# Patient Record
Sex: MALE | Race: WHITE | Employment: OTHER | URBAN - METROPOLITAN AREA
[De-identification: names, ages, dates, MRNs, and addresses within clinical notes are randomized per-mention and may not be internally consistent; named-entity substitution may affect disease eponyms.]

---

## 2018-12-11 ENCOUNTER — HOSPITAL ENCOUNTER (EMERGENCY)
Age: 63
Discharge: HOME | End: 2018-12-11
Attending: EMERGENCY MEDICINE
Payer: COMMERCIAL

## 2018-12-11 VITALS
HEART RATE: 48 BPM | RESPIRATION RATE: 12 BRPM | DIASTOLIC BLOOD PRESSURE: 66 MMHG | TEMPERATURE: 98.3 F | OXYGEN SATURATION: 95 % | SYSTOLIC BLOOD PRESSURE: 112 MMHG

## 2018-12-11 DIAGNOSIS — T40.1X1A ACCIDENTAL OVERDOSE OF HEROIN, INITIAL ENCOUNTER (HCC): Primary | ICD-10-CM

## 2018-12-11 PROCEDURE — 99284 EMERGENCY DEPT VISIT MOD MDM: CPT

## 2018-12-11 RX ORDER — NALOXONE HYDROCHLORIDE 4 MG/.1ML
1 SPRAY NASAL PRN
Qty: 1 EACH | Refills: 0 | Status: SHIPPED | OUTPATIENT
Start: 2018-12-11 | End: 2020-07-09

## 2020-07-09 ENCOUNTER — HOSPITAL ENCOUNTER (INPATIENT)
Age: 65
LOS: 6 days | Discharge: NURSING, REHAB OR LONG-TERM CARE | DRG: 253 | End: 2020-07-16
Attending: PEDIATRICS | Admitting: PEDIATRICS
Payer: MEDICARE

## 2020-07-09 ENCOUNTER — APPOINTMENT (OUTPATIENT)
Dept: GENERAL RADIOLOGY | Age: 65
DRG: 253 | End: 2020-07-09
Payer: MEDICARE

## 2020-07-09 PROBLEM — R22.42 LOCALIZED SWELLING OF LEFT LOWER LEG: Status: ACTIVE | Noted: 2020-07-09

## 2020-07-09 LAB
ALBUMIN SERPL-MCNC: 3.9 G/DL (ref 3.4–5)
ALP BLD-CCNC: 102 U/L (ref 40–129)
ALT SERPL-CCNC: 122 U/L (ref 10–40)
AMPHETAMINE SCREEN, URINE: ABNORMAL
ANION GAP SERPL CALCULATED.3IONS-SCNC: 12 MMOL/L (ref 3–16)
AST SERPL-CCNC: 200 U/L (ref 15–37)
BARBITURATE SCREEN URINE: ABNORMAL
BASOPHILS ABSOLUTE: 0 K/UL (ref 0–0.2)
BASOPHILS RELATIVE PERCENT: 0.6 %
BENZODIAZEPINE SCREEN, URINE: ABNORMAL
BILIRUB SERPL-MCNC: 0.3 MG/DL (ref 0–1)
BILIRUBIN DIRECT: <0.2 MG/DL (ref 0–0.3)
BILIRUBIN URINE: NEGATIVE
BILIRUBIN, INDIRECT: ABNORMAL MG/DL (ref 0–1)
BLOOD, URINE: NEGATIVE
BUN BLDV-MCNC: 7 MG/DL (ref 7–20)
C-REACTIVE PROTEIN: 0.7 MG/L (ref 0–5.1)
CALCIUM SERPL-MCNC: 9.3 MG/DL (ref 8.3–10.6)
CANNABINOID SCREEN URINE: POSITIVE
CHLORIDE BLD-SCNC: 98 MMOL/L (ref 99–110)
CLARITY: ABNORMAL
CO2: 26 MMOL/L (ref 21–32)
COCAINE METABOLITE SCREEN URINE: ABNORMAL
COLOR: ABNORMAL
CREAT SERPL-MCNC: 0.6 MG/DL (ref 0.8–1.3)
EOSINOPHILS ABSOLUTE: 0.2 K/UL (ref 0–0.6)
EOSINOPHILS RELATIVE PERCENT: 3.8 %
EPITHELIAL CELLS, UA: 1 /HPF (ref 0–5)
GFR AFRICAN AMERICAN: >60
GFR NON-AFRICAN AMERICAN: >60
GLUCOSE BLD-MCNC: 111 MG/DL (ref 70–99)
GLUCOSE URINE: NEGATIVE MG/DL
HCT VFR BLD CALC: 42.1 % (ref 40.5–52.5)
HEMOGLOBIN: 13.8 G/DL (ref 13.5–17.5)
HYALINE CASTS: 4 /LPF (ref 0–8)
KETONES, URINE: NEGATIVE MG/DL
LACTIC ACID: 2.2 MMOL/L (ref 0.4–2)
LEUKOCYTE ESTERASE, URINE: NEGATIVE
LYMPHOCYTES ABSOLUTE: 1.8 K/UL (ref 1–5.1)
LYMPHOCYTES RELATIVE PERCENT: 31.6 %
Lab: ABNORMAL
MCH RBC QN AUTO: 31.3 PG (ref 26–34)
MCHC RBC AUTO-ENTMCNC: 32.8 G/DL (ref 31–36)
MCV RBC AUTO: 95.4 FL (ref 80–100)
METHADONE SCREEN, URINE: ABNORMAL
MICROSCOPIC EXAMINATION: YES
MONOCYTES ABSOLUTE: 0.8 K/UL (ref 0–1.3)
MONOCYTES RELATIVE PERCENT: 14.3 %
NEUTROPHILS ABSOLUTE: 2.8 K/UL (ref 1.7–7.7)
NEUTROPHILS RELATIVE PERCENT: 49.7 %
NITRITE, URINE: NEGATIVE
OPIATE SCREEN URINE: ABNORMAL
OXYCODONE URINE: ABNORMAL
PDW BLD-RTO: 14 % (ref 12.4–15.4)
PH UA: 5.5
PH UA: 5.5 (ref 5–8)
PHENCYCLIDINE SCREEN URINE: ABNORMAL
PLATELET # BLD: 147 K/UL (ref 135–450)
PMV BLD AUTO: 7.9 FL (ref 5–10.5)
POTASSIUM REFLEX MAGNESIUM: 4.1 MMOL/L (ref 3.5–5.1)
PROCALCITONIN: 0.06 NG/ML (ref 0–0.15)
PROPOXYPHENE SCREEN: ABNORMAL
PROTEIN UA: NEGATIVE MG/DL
RBC # BLD: 4.41 M/UL (ref 4.2–5.9)
RBC UA: 1 /HPF (ref 0–4)
SEDIMENTATION RATE, ERYTHROCYTE: 11 MM/HR (ref 0–20)
SODIUM BLD-SCNC: 136 MMOL/L (ref 136–145)
SPECIFIC GRAVITY UA: 1.02 (ref 1–1.03)
TOTAL PROTEIN: 7 G/DL (ref 6.4–8.2)
URINE REFLEX TO CULTURE: ABNORMAL
URINE TYPE: ABNORMAL
UROBILINOGEN, URINE: 0.2 E.U./DL
WBC # BLD: 5.6 K/UL (ref 4–11)
WBC UA: 1 /HPF (ref 0–5)

## 2020-07-09 PROCEDURE — G0378 HOSPITAL OBSERVATION PER HR: HCPCS

## 2020-07-09 PROCEDURE — 81001 URINALYSIS AUTO W/SCOPE: CPT

## 2020-07-09 PROCEDURE — 80048 BASIC METABOLIC PNL TOTAL CA: CPT

## 2020-07-09 PROCEDURE — 71046 X-RAY EXAM CHEST 2 VIEWS: CPT

## 2020-07-09 PROCEDURE — 85025 COMPLETE CBC W/AUTO DIFF WBC: CPT

## 2020-07-09 PROCEDURE — 83605 ASSAY OF LACTIC ACID: CPT

## 2020-07-09 PROCEDURE — 84145 PROCALCITONIN (PCT): CPT

## 2020-07-09 PROCEDURE — 36415 COLL VENOUS BLD VENIPUNCTURE: CPT

## 2020-07-09 PROCEDURE — 80076 HEPATIC FUNCTION PANEL: CPT

## 2020-07-09 PROCEDURE — 86140 C-REACTIVE PROTEIN: CPT

## 2020-07-09 PROCEDURE — 85652 RBC SED RATE AUTOMATED: CPT

## 2020-07-09 PROCEDURE — 99284 EMERGENCY DEPT VISIT MOD MDM: CPT

## 2020-07-09 PROCEDURE — 80307 DRUG TEST PRSMV CHEM ANLYZR: CPT

## 2020-07-09 PROCEDURE — 87040 BLOOD CULTURE FOR BACTERIA: CPT

## 2020-07-09 PROCEDURE — 73630 X-RAY EXAM OF FOOT: CPT

## 2020-07-09 ASSESSMENT — ENCOUNTER SYMPTOMS
SHORTNESS OF BREATH: 0
COLOR CHANGE: 1
ABDOMINAL PAIN: 0
NAUSEA: 0
VOMITING: 0
COUGH: 0

## 2020-07-09 ASSESSMENT — PAIN DESCRIPTION - DESCRIPTORS: DESCRIPTORS: ACHING

## 2020-07-09 ASSESSMENT — PAIN DESCRIPTION - PAIN TYPE: TYPE: ACUTE PAIN

## 2020-07-09 ASSESSMENT — PAIN DESCRIPTION - ONSET: ONSET: ON-GOING

## 2020-07-09 ASSESSMENT — PAIN DESCRIPTION - PROGRESSION: CLINICAL_PROGRESSION: NOT CHANGED

## 2020-07-09 ASSESSMENT — PAIN DESCRIPTION - FREQUENCY: FREQUENCY: CONTINUOUS

## 2020-07-09 ASSESSMENT — PAIN DESCRIPTION - ORIENTATION: ORIENTATION: LEFT

## 2020-07-09 ASSESSMENT — PAIN - FUNCTIONAL ASSESSMENT: PAIN_FUNCTIONAL_ASSESSMENT: PREVENTS OR INTERFERES SOME ACTIVE ACTIVITIES AND ADLS

## 2020-07-09 ASSESSMENT — PAIN SCALES - GENERAL: PAINLEVEL_OUTOF10: 6

## 2020-07-09 ASSESSMENT — PAIN DESCRIPTION - LOCATION: LOCATION: ABDOMEN

## 2020-07-10 ENCOUNTER — TELEPHONE (OUTPATIENT)
Dept: ORTHOPEDIC SURGERY | Age: 65
End: 2020-07-10

## 2020-07-10 LAB
A/G RATIO: 1.2 (ref 1.1–2.2)
ALBUMIN SERPL-MCNC: 3.8 G/DL (ref 3.4–5)
ALP BLD-CCNC: 99 U/L (ref 40–129)
ALT SERPL-CCNC: 95 U/L (ref 10–40)
ANION GAP SERPL CALCULATED.3IONS-SCNC: 15 MMOL/L (ref 3–16)
APTT: 67 SEC (ref 24.2–36.2)
AST SERPL-CCNC: 93 U/L (ref 15–37)
BASOPHILS ABSOLUTE: 0.1 K/UL (ref 0–0.2)
BASOPHILS RELATIVE PERCENT: 1.5 %
BILIRUB SERPL-MCNC: 0.6 MG/DL (ref 0–1)
BUN BLDV-MCNC: 6 MG/DL (ref 7–20)
CALCIUM SERPL-MCNC: 9.2 MG/DL (ref 8.3–10.6)
CHLORIDE BLD-SCNC: 93 MMOL/L (ref 99–110)
CO2: 28 MMOL/L (ref 21–32)
CREAT SERPL-MCNC: <0.5 MG/DL (ref 0.8–1.3)
D DIMER: 1645 NG/ML DDU (ref 0–229)
EOSINOPHILS ABSOLUTE: 0.1 K/UL (ref 0–0.6)
EOSINOPHILS RELATIVE PERCENT: 2.4 %
FIBRINOGEN: 213 MG/DL (ref 200–397)
FIBRINOGEN: 229 MG/DL (ref 200–397)
GFR AFRICAN AMERICAN: >60
GFR NON-AFRICAN AMERICAN: >60
GLOBULIN: 3.1 G/DL
GLUCOSE BLD-MCNC: 87 MG/DL (ref 70–99)
HCT VFR BLD CALC: 38 % (ref 40.5–52.5)
HCT VFR BLD CALC: 42.8 % (ref 40.5–52.5)
HEMOGLOBIN: 12.7 G/DL (ref 13.5–17.5)
HEMOGLOBIN: 14.1 G/DL (ref 13.5–17.5)
INR BLD: 0.98 (ref 0.86–1.14)
LYMPHOCYTES ABSOLUTE: 1.4 K/UL (ref 1–5.1)
LYMPHOCYTES RELATIVE PERCENT: 28.1 %
MCH RBC QN AUTO: 31.4 PG (ref 26–34)
MCH RBC QN AUTO: 31.9 PG (ref 26–34)
MCHC RBC AUTO-ENTMCNC: 33 G/DL (ref 31–36)
MCHC RBC AUTO-ENTMCNC: 33.4 G/DL (ref 31–36)
MCV RBC AUTO: 95.2 FL (ref 80–100)
MCV RBC AUTO: 95.7 FL (ref 80–100)
MONOCYTES ABSOLUTE: 0.6 K/UL (ref 0–1.3)
MONOCYTES RELATIVE PERCENT: 12.7 %
NEUTROPHILS ABSOLUTE: 2.8 K/UL (ref 1.7–7.7)
NEUTROPHILS RELATIVE PERCENT: 55.3 %
PDW BLD-RTO: 13.7 % (ref 12.4–15.4)
PDW BLD-RTO: 13.8 % (ref 12.4–15.4)
PLATELET # BLD: 133 K/UL (ref 135–450)
PLATELET # BLD: 139 K/UL (ref 135–450)
PMV BLD AUTO: 8.3 FL (ref 5–10.5)
PMV BLD AUTO: 8.7 FL (ref 5–10.5)
POTASSIUM SERPL-SCNC: 4 MMOL/L (ref 3.5–5.1)
PROTHROMBIN TIME: 11.4 SEC (ref 10–13.2)
RBC # BLD: 3.98 M/UL (ref 4.2–5.9)
RBC # BLD: 4.5 M/UL (ref 4.2–5.9)
SODIUM BLD-SCNC: 136 MMOL/L (ref 136–145)
TOTAL PROTEIN: 6.9 G/DL (ref 6.4–8.2)
URIC ACID, SERUM: 5.4 MG/DL (ref 3.5–7.2)
WBC # BLD: 5 K/UL (ref 4–11)
WBC # BLD: 5.2 K/UL (ref 4–11)

## 2020-07-10 PROCEDURE — 2500000003 HC RX 250 WO HCPCS: Performed by: PEDIATRICS

## 2020-07-10 PROCEDURE — 2709999900 HC NON-CHARGEABLE SUPPLY

## 2020-07-10 PROCEDURE — 6360000002 HC RX W HCPCS

## 2020-07-10 PROCEDURE — 36005 INJECTION EXT VENOGRAPHY: CPT | Performed by: INTERNAL MEDICINE

## 2020-07-10 PROCEDURE — 99153 MOD SED SAME PHYS/QHP EA: CPT

## 2020-07-10 PROCEDURE — 36415 COLL VENOUS BLD VENIPUNCTURE: CPT

## 2020-07-10 PROCEDURE — 85610 PROTHROMBIN TIME: CPT

## 2020-07-10 PROCEDURE — 2100000000 HC CCU R&B

## 2020-07-10 PROCEDURE — C1894 INTRO/SHEATH, NON-LASER: HCPCS

## 2020-07-10 PROCEDURE — 36005 INJECTION EXT VENOGRAPHY: CPT

## 2020-07-10 PROCEDURE — 6370000000 HC RX 637 (ALT 250 FOR IP): Performed by: PEDIATRICS

## 2020-07-10 PROCEDURE — 2580000003 HC RX 258: Performed by: PEDIATRICS

## 2020-07-10 PROCEDURE — B51C1ZZ FLUOROSCOPY OF LEFT LOWER EXTREMITY VEINS USING LOW OSMOLAR CONTRAST: ICD-10-PCS | Performed by: INTERNAL MEDICINE

## 2020-07-10 PROCEDURE — 6360000002 HC RX W HCPCS: Performed by: PEDIATRICS

## 2020-07-10 PROCEDURE — 37249 TRLUML BALO ANGIOP ADDL VEIN: CPT

## 2020-07-10 PROCEDURE — 80053 COMPREHEN METABOLIC PANEL: CPT

## 2020-07-10 PROCEDURE — 37212 THROMBOLYTIC VENOUS THERAPY: CPT | Performed by: INTERNAL MEDICINE

## 2020-07-10 PROCEDURE — 84550 ASSAY OF BLOOD/URIC ACID: CPT

## 2020-07-10 PROCEDURE — 76499 UNLISTED DX RADIOGRAPHIC PX: CPT

## 2020-07-10 PROCEDURE — C1751 CATH, INF, PER/CENT/MIDLINE: HCPCS

## 2020-07-10 PROCEDURE — 75820 VEIN X-RAY ARM/LEG: CPT

## 2020-07-10 PROCEDURE — 93971 EXTREMITY STUDY: CPT

## 2020-07-10 PROCEDURE — 85730 THROMBOPLASTIN TIME PARTIAL: CPT

## 2020-07-10 PROCEDURE — 2500000003 HC RX 250 WO HCPCS

## 2020-07-10 PROCEDURE — 6360000004 HC RX CONTRAST MEDICATION: Performed by: HOSPITALIST

## 2020-07-10 PROCEDURE — 6360000002 HC RX W HCPCS: Performed by: INTERNAL MEDICINE

## 2020-07-10 PROCEDURE — 99215 OFFICE O/P EST HI 40 MIN: CPT | Performed by: INTERNAL MEDICINE

## 2020-07-10 PROCEDURE — 85027 COMPLETE CBC AUTOMATED: CPT

## 2020-07-10 PROCEDURE — C1769 GUIDE WIRE: HCPCS

## 2020-07-10 PROCEDURE — 85384 FIBRINOGEN ACTIVITY: CPT

## 2020-07-10 PROCEDURE — 2580000003 HC RX 258: Performed by: INTERNAL MEDICINE

## 2020-07-10 PROCEDURE — 3E03317 INTRODUCTION OF OTHER THROMBOLYTIC INTO PERIPHERAL VEIN, PERCUTANEOUS APPROACH: ICD-10-PCS | Performed by: INTERNAL MEDICINE

## 2020-07-10 PROCEDURE — 067N3ZZ DILATION OF LEFT FEMORAL VEIN, PERCUTANEOUS APPROACH: ICD-10-PCS | Performed by: INTERNAL MEDICINE

## 2020-07-10 PROCEDURE — 37248 TRLUML BALO ANGIOP 1ST VEIN: CPT

## 2020-07-10 PROCEDURE — C1887 CATHETER, GUIDING: HCPCS

## 2020-07-10 PROCEDURE — 2580000003 HC RX 258

## 2020-07-10 PROCEDURE — 75820 VEIN X-RAY ARM/LEG: CPT | Performed by: INTERNAL MEDICINE

## 2020-07-10 PROCEDURE — 6370000000 HC RX 637 (ALT 250 FOR IP): Performed by: HOSPITALIST

## 2020-07-10 PROCEDURE — C1725 CATH, TRANSLUMIN NON-LASER: HCPCS

## 2020-07-10 PROCEDURE — 85025 COMPLETE CBC W/AUTO DIFF WBC: CPT

## 2020-07-10 PROCEDURE — 85379 FIBRIN DEGRADATION QUANT: CPT

## 2020-07-10 PROCEDURE — 99152 MOD SED SAME PHYS/QHP 5/>YRS: CPT

## 2020-07-10 PROCEDURE — 37212 THROMBOLYTIC VENOUS THERAPY: CPT

## 2020-07-10 RX ORDER — SODIUM CHLORIDE 0.9 % (FLUSH) 0.9 %
10 SYRINGE (ML) INJECTION EVERY 12 HOURS SCHEDULED
Status: DISCONTINUED | OUTPATIENT
Start: 2020-07-10 | End: 2020-07-10 | Stop reason: SDUPTHER

## 2020-07-10 RX ORDER — LORAZEPAM 1 MG/1
2 TABLET ORAL
Status: DISCONTINUED | OUTPATIENT
Start: 2020-07-10 | End: 2020-07-11

## 2020-07-10 RX ORDER — HEPARIN SODIUM AND DEXTROSE 10000; 5 [USP'U]/100ML; G/100ML
250 INJECTION INTRAVENOUS CONTINUOUS
Status: DISCONTINUED | OUTPATIENT
Start: 2020-07-10 | End: 2020-07-13 | Stop reason: ALTCHOICE

## 2020-07-10 RX ORDER — LORAZEPAM 1 MG/1
3 TABLET ORAL
Status: DISCONTINUED | OUTPATIENT
Start: 2020-07-10 | End: 2020-07-11

## 2020-07-10 RX ORDER — SODIUM CHLORIDE 0.9 % (FLUSH) 0.9 %
10 SYRINGE (ML) INJECTION PRN
Status: DISCONTINUED | OUTPATIENT
Start: 2020-07-10 | End: 2020-07-10 | Stop reason: SDUPTHER

## 2020-07-10 RX ORDER — LORAZEPAM 2 MG/ML
1 INJECTION INTRAMUSCULAR
Status: DISCONTINUED | OUTPATIENT
Start: 2020-07-10 | End: 2020-07-11

## 2020-07-10 RX ORDER — LORAZEPAM 1 MG/1
1 TABLET ORAL
Status: DISCONTINUED | OUTPATIENT
Start: 2020-07-10 | End: 2020-07-11

## 2020-07-10 RX ORDER — DIPHENHYDRAMINE HCL 25 MG
25 TABLET ORAL NIGHTLY PRN
Status: DISCONTINUED | OUTPATIENT
Start: 2020-07-10 | End: 2020-07-16 | Stop reason: HOSPADM

## 2020-07-10 RX ORDER — SODIUM CHLORIDE 0.9 % (FLUSH) 0.9 %
10 SYRINGE (ML) INJECTION PRN
Status: DISCONTINUED | OUTPATIENT
Start: 2020-07-10 | End: 2020-07-13 | Stop reason: SDUPTHER

## 2020-07-10 RX ORDER — SODIUM CHLORIDE 9 MG/ML
INJECTION, SOLUTION INTRAVENOUS CONTINUOUS
Status: DISCONTINUED | OUTPATIENT
Start: 2020-07-10 | End: 2020-07-13 | Stop reason: ALTCHOICE

## 2020-07-10 RX ORDER — LORAZEPAM 2 MG/ML
3 INJECTION INTRAMUSCULAR
Status: DISCONTINUED | OUTPATIENT
Start: 2020-07-10 | End: 2020-07-11

## 2020-07-10 RX ORDER — ACETAMINOPHEN 325 MG/1
650 TABLET ORAL EVERY 6 HOURS PRN
Status: DISCONTINUED | OUTPATIENT
Start: 2020-07-10 | End: 2020-07-13 | Stop reason: SDUPTHER

## 2020-07-10 RX ORDER — LORAZEPAM 1 MG/1
4 TABLET ORAL
Status: DISCONTINUED | OUTPATIENT
Start: 2020-07-10 | End: 2020-07-11

## 2020-07-10 RX ORDER — FOLIC ACID 1 MG/1
1 TABLET ORAL DAILY
Status: DISCONTINUED | OUTPATIENT
Start: 2020-07-10 | End: 2020-07-12

## 2020-07-10 RX ORDER — LORAZEPAM 2 MG/ML
4 INJECTION INTRAMUSCULAR
Status: DISCONTINUED | OUTPATIENT
Start: 2020-07-10 | End: 2020-07-11

## 2020-07-10 RX ORDER — SODIUM CHLORIDE 0.9 % (FLUSH) 0.9 %
10 SYRINGE (ML) INJECTION EVERY 12 HOURS SCHEDULED
Status: DISCONTINUED | OUTPATIENT
Start: 2020-07-10 | End: 2020-07-13 | Stop reason: SDUPTHER

## 2020-07-10 RX ORDER — ONDANSETRON 4 MG/1
4 TABLET, ORALLY DISINTEGRATING ORAL EVERY 8 HOURS PRN
Status: DISCONTINUED | OUTPATIENT
Start: 2020-07-10 | End: 2020-07-16 | Stop reason: HOSPADM

## 2020-07-10 RX ORDER — LORAZEPAM 2 MG/ML
2 INJECTION INTRAMUSCULAR
Status: DISCONTINUED | OUTPATIENT
Start: 2020-07-10 | End: 2020-07-11

## 2020-07-10 RX ORDER — ACETAMINOPHEN 650 MG/1
650 SUPPOSITORY RECTAL EVERY 6 HOURS PRN
Status: DISCONTINUED | OUTPATIENT
Start: 2020-07-10 | End: 2020-07-13 | Stop reason: SDUPTHER

## 2020-07-10 RX ORDER — THIAMINE MONONITRATE (VIT B1) 100 MG
100 TABLET ORAL DAILY
Status: DISCONTINUED | OUTPATIENT
Start: 2020-07-10 | End: 2020-07-12

## 2020-07-10 RX ORDER — ONDANSETRON 2 MG/ML
4 INJECTION INTRAMUSCULAR; INTRAVENOUS EVERY 6 HOURS PRN
Status: DISCONTINUED | OUTPATIENT
Start: 2020-07-10 | End: 2020-07-16 | Stop reason: HOSPADM

## 2020-07-10 RX ORDER — DIPHENHYDRAMINE HCL 25 MG
25 TABLET ORAL NIGHTLY PRN
Status: DISCONTINUED | OUTPATIENT
Start: 2020-07-11 | End: 2020-07-10

## 2020-07-10 RX ORDER — DIAZEPAM 5 MG/1
5 TABLET ORAL ONCE
Status: COMPLETED | OUTPATIENT
Start: 2020-07-10 | End: 2020-07-10

## 2020-07-10 RX ORDER — MULTIVITAMIN WITH IRON
1 TABLET ORAL DAILY
Status: DISCONTINUED | OUTPATIENT
Start: 2020-07-10 | End: 2020-07-12

## 2020-07-10 RX ORDER — POLYETHYLENE GLYCOL 3350 17 G/17G
17 POWDER, FOR SOLUTION ORAL DAILY PRN
Status: DISCONTINUED | OUTPATIENT
Start: 2020-07-10 | End: 2020-07-12

## 2020-07-10 RX ADMIN — THERA TABS 1 TABLET: TAB at 11:07

## 2020-07-10 RX ADMIN — IOPAMIDOL 60 ML: 755 INJECTION, SOLUTION INTRAVENOUS at 18:15

## 2020-07-10 RX ADMIN — HEPARIN SODIUM 250 UNITS/HR: 10000 INJECTION, SOLUTION INTRAVENOUS at 18:34

## 2020-07-10 RX ADMIN — ENOXAPARIN SODIUM 90 MG: 100 INJECTION SUBCUTANEOUS at 11:07

## 2020-07-10 RX ADMIN — Medication 100 MG: at 11:07

## 2020-07-10 RX ADMIN — FOLIC ACID 1 MG: 1 TABLET ORAL at 11:10

## 2020-07-10 RX ADMIN — LORAZEPAM 1 MG: 2 INJECTION INTRAMUSCULAR; INTRAVENOUS at 15:59

## 2020-07-10 RX ADMIN — SODIUM CHLORIDE, PRESERVATIVE FREE 10 ML: 5 INJECTION INTRAVENOUS at 11:08

## 2020-07-10 RX ADMIN — SODIUM CHLORIDE, PRESERVATIVE FREE 10 ML: 5 INJECTION INTRAVENOUS at 22:14

## 2020-07-10 RX ADMIN — SODIUM CHLORIDE: 9 INJECTION, SOLUTION INTRAVENOUS at 18:33

## 2020-07-10 RX ADMIN — FOLIC ACID: 5 INJECTION, SOLUTION INTRAMUSCULAR; INTRAVENOUS; SUBCUTANEOUS at 02:49

## 2020-07-10 RX ADMIN — DIAZEPAM 5 MG: 5 TABLET ORAL at 00:54

## 2020-07-10 RX ADMIN — LORAZEPAM 2 MG: 2 INJECTION INTRAMUSCULAR; INTRAVENOUS at 12:47

## 2020-07-10 RX ADMIN — ALTEPLASE 1 MG/HR: 2.2 INJECTION, POWDER, LYOPHILIZED, FOR SOLUTION INTRAVENOUS at 18:34

## 2020-07-10 RX ADMIN — ENOXAPARIN SODIUM 90 MG: 100 INJECTION SUBCUTANEOUS at 02:54

## 2020-07-10 ASSESSMENT — PAIN DESCRIPTION - LOCATION
LOCATION: LEG

## 2020-07-10 ASSESSMENT — PAIN DESCRIPTION - DESCRIPTORS
DESCRIPTORS: SORE
DESCRIPTORS: ACHING;SORE
DESCRIPTORS: ACHING;SORE

## 2020-07-10 ASSESSMENT — PAIN DESCRIPTION - PROGRESSION
CLINICAL_PROGRESSION: NOT CHANGED

## 2020-07-10 ASSESSMENT — PAIN DESCRIPTION - ORIENTATION
ORIENTATION: LEFT

## 2020-07-10 ASSESSMENT — PAIN DESCRIPTION - ONSET
ONSET: ON-GOING

## 2020-07-10 ASSESSMENT — PAIN DESCRIPTION - PAIN TYPE
TYPE: ACUTE PAIN
TYPE: ACUTE PAIN;CHRONIC PAIN
TYPE: ACUTE PAIN

## 2020-07-10 ASSESSMENT — PAIN - FUNCTIONAL ASSESSMENT
PAIN_FUNCTIONAL_ASSESSMENT: PREVENTS OR INTERFERES SOME ACTIVE ACTIVITIES AND ADLS

## 2020-07-10 ASSESSMENT — PAIN SCALES - GENERAL
PAINLEVEL_OUTOF10: 5
PAINLEVEL_OUTOF10: 0
PAINLEVEL_OUTOF10: 5
PAINLEVEL_OUTOF10: 4
PAINLEVEL_OUTOF10: 0

## 2020-07-10 ASSESSMENT — PAIN DESCRIPTION - FREQUENCY
FREQUENCY: CONTINUOUS

## 2020-07-10 ASSESSMENT — PAIN DESCRIPTION - DIRECTION: RADIATING_TOWARDS: LEFT FOOT

## 2020-07-10 NOTE — PLAN OF CARE
Problem: Pain:  Description: Pain management should include both nonpharmacologic and pharmacologic interventions. Goal: Pain level will decrease  Description: Pain level will decrease  7/10/2020 1457 by Marily Hubbard RN  Outcome: Ongoing     Problem: Pain:  Description: Pain management should include both nonpharmacologic and pharmacologic interventions. Goal: Control of acute pain  Description: Control of acute pain  7/10/2020 1457 by Marily Hubbard RN  Outcome: Ongoing     Problem: Pain:  Description: Pain management should include both nonpharmacologic and pharmacologic interventions.   Goal: Control of chronic pain  Description: Control of chronic pain  7/10/2020 1457 by Marily Hubbard RN  Outcome: Ongoing     Problem: Falls - Risk of:  Goal: Will remain free from falls  Description: Will remain free from falls  7/10/2020 1457 by Marily Hubbard RN  Outcome: Ongoing     Problem: Falls - Risk of:  Goal: Absence of physical injury  Description: Absence of physical injury  7/10/2020 1457 by Marily Hubbard RN  Outcome: Ongoing

## 2020-07-10 NOTE — PROGRESS NOTES
0755 Pt rated his pain 5 out of 10 in left leg but refused Tylenol. No other pain medication ordered. Pt off unit for procedure at 0845 , transported by stretcher. Pt return to unit at 0958 . Patient is A&O awake,calm, quiet in bed. Patient bed in lowest position, call light in reach, and bed alarm engaged. This RN will continue to monitor. Jose Quigley

## 2020-07-10 NOTE — PROGRESS NOTES
Hospitalist Progress Note    CC:     Leg Pain (leg pain and swelling. onset 6 weeks ago. leg is swollen and red. denies fever)      Admit date: 7/9/2020  Days in hospital:  0    Subjective:       Significant pain in his leg, not able to walk. Worried about alcohol withdrawal and asking for beers     ROS:   Review of Systems   Constitutional: Negative for chills and fever. Respiratory: Negative for cough and shortness of breath. Cardiovascular: Negative for chest pain and palpitations. Gastrointestinal: Negative for abdominal pain, constipation and diarrhea. Genitourinary: Negative for dysuria and urgency. Neurological: Negative for headaches. Objective:    /63   Pulse 62   Temp 97.8 °F (36.6 °C) (Oral)   Resp 14   Ht 6' 3\" (1.905 m)   Wt 206 lb 12.7 oz (93.8 kg)   SpO2 94%   BMI 25.85 kg/m²     Gen: alert, NAD  HEENT: NC/AT, moist mucous membranes, no oropharyngeal erythema or exudate  Neck: supple, trachea midline, no anterior cervical or SC LAD  Heart: Normal s1/s2, RRR, no murmurs, gallops, or rubs. Lungs: CTA bilaterally, no wheezing, no rales, no rhonchi, no use of accessory muscles  Abd: bowel sounds present, soft, nondistended, none tender  Extrem: Left lower Ext swelling and tenderness all the way to his thigh. Good peripheral pulses. Skin: Poor hygiene. Psych:A & O x3  , affect appropriate  Neuro: grossly intact, moves all four extremities spontaneously.       Medications:  Scheduled Meds:   sodium chloride flush  10 mL Intravenous 2 times per day    thiamine  100 mg Oral Daily    multivitamin  1 tablet Oral Daily    enoxaparin  1 mg/kg Subcutaneous BID       PRN Meds:  sodium chloride flush, acetaminophen **OR** acetaminophen, polyethylene glycol, ondansetron **OR** ondansetron, LORazepam **OR** LORazepam **OR** LORazepam **OR** LORazepam **OR** LORazepam **OR** LORazepam **OR** LORazepam **OR** LORazepam    IV:      No intake or output data in the 24

## 2020-07-10 NOTE — ED NOTES
Report given to Ezra Garcia RN to assume care. No further questions at this time.      Brooke Sagastume RN  07/09/20 2691

## 2020-07-10 NOTE — H&P
rate and rhythm with normal S1/S2 without murmurs, rubs or gallops. Abdomen: Soft, non-tender, non-distended with normal bowel sounds. Musculoskeletal:  No clubbing, cyanosis or edema bilaterally. Full range of motion without deformity. Skin: Left leg erythematous and diffusely swollen. Pitting edema to knee. Very tender to palpation in calf. Neurologic:  Neurovascularly intact without any focal sensory/motor deficits. Cranial nerves: II-XII intact, grossly non-focal.  Psychiatric:  Alert and oriented, thought content appropriate, normal insight  Capillary Refill: Brisk,< 3 seconds   Peripheral Pulses: +2 palpable, equal bilaterally       Labs:     Recent Labs     07/09/20  1750   WBC 5.6   HGB 13.8   HCT 42.1        Recent Labs     07/09/20  1750      K 4.1   CL 98*   CO2 26   BUN 7   CREATININE 0.6*   CALCIUM 9.3     Recent Labs     07/09/20  1750   *   *   BILIDIR <0.2   BILITOT 0.3   ALKPHOS 102     No results for input(s): INR in the last 72 hours. No results for input(s): Jade Formosa in the last 72 hours. Urinalysis:      Lab Results   Component Value Date    NITRU Negative 07/09/2020    WBCUA 1 07/09/2020    RBCUA 1 07/09/2020    BLOODU Negative 07/09/2020    SPECGRAV 1.019 07/09/2020    GLUCOSEU Negative 07/09/2020      Radiology:     XR CHEST STANDARD (2 VW)   Final Result   No evidence of acute cardiopulmonary disease. XR FOOT LEFT (MIN 3 VIEWS)    (Results Pending)       ASSESSMENT/PLAN:  L leg swelling  Think most likely this is DVT. No leukocytosis and minimally elevated inflammatory markers. Well score for DVT of 4.   D-dimer result from lab reported as high with quantification still pending as a result.  -Starting Lovenox 1 mg/kg twice daily subcutaneous  -Left lower extremity ultrasound in morning    Alcohol abuse  -[facility] protocol    DVT Prophylaxis:Lovenox   Diet:Regular    Code Status:Full   Surrogate:Brother in law, [facility] [employer] [employer] -admitting to [facility] bed anticipate greater than 2 midnight stay       Sanda Schirmer, MD    Thank you No primary care provider on file. for the opportunity to be involved in this patient's care. If you have any questions or concerns please feel free to contact me at [phone].

## 2020-07-10 NOTE — CARE COORDINATION
[facility] Nurse  Consult Note       NAME:  Annabel Menendez  MEDICAL RECORD NUMBER:  [id number]  AGE: 72 y.o. GENDER: male  : 1955  TODAY'S DATE:  7/10/2020    Subjective   Reason for WOCN Evaluation and Assessment: unstageable to left lateral great toe-> no wound noted, area is red, closed. Per pt had been scraped at one time. Please re-consult if needed. Annabel Menendez is a 72 y.o. male referred by:   [] Physician  [x] Nursing  [] Other:   Patient Goal of Care:  [] Wound Healing  [] Odor Control  [] Palliative Care  [] Pain Control   [] Other:         PAST MEDICAL HISTORY        Diagnosis Date    Hyperlipidemia     Seizures ([facility] [address]        PAST SURGICAL HISTORY    History reviewed. No pertinent surgical history. FAMILY HISTORY    History reviewed. No pertinent family history. SOCIAL HISTORY    Social History     Tobacco Use    Smoking status: Current Every Day Smoker     Packs/day: 1.50     Types: Cigarettes    Smokeless tobacco: Never Used   Substance Use Topics    Alcohol use: Yes     Alcohol/week: 56.0 standard drinks     Types: 56 Cans of beer per week    Drug use: Yes     Types: Marijuana, IV, Opiates      Comment: Heroin - snorts       ALLERGIES    No Known Allergies    MEDICATIONS    No current facility-administered medications on file prior to encounter. No current outpatient medications on file prior to encounter.        Objective    /63   Pulse 72   Temp 97.8 °F (36.6 °C) (Oral)   Resp 14   Ht 6' 3\" (1.905 m)   Wt 206 lb 12.7 oz (93.8 kg)   SpO2 94%   BMI 25.85 kg/m²     LABS:  WBC:    Lab Results   Component Value Date    WBC 5.6 2020     H/H:    Lab Results   Component Value Date    HGB 13.8 2020    HCT 42.1 2020     PTT:  No results found for: APTT, PTT[APTT}  PT/INR:  No results found for: PROTIME, INR  HgBA1c:  No results found for: LABA1C    Assessment   Alen Risk Score: Alen Scale Score: 19    Patient Active Problem List   Diagnosis Code    Localized swelling of left lower leg R22.42       Measurements:      Pt seen. LLE edmeatous, red and warm from toes to groin. Vascualr studies done -       Left   - Acute versus subacute totally occluding deep vein thrombosis involving the   left external iliac vein, common femoral, profunda, femoral, posterior   tibials, perennials and anterior tibial veins.   - There is acute versus subacute partially occluding deep vein thrombosis   involving the left popliteal vein. - Acute totally occluding superficial venous thrombosis involving the left   greater saphenous vein from zone 1 to zone 8 (the entire length of the leg). - Calf veins were not well visualized on the left due to edema.   - There is no other evidence of deep vein or superficial vein thrombosis   involving the left lower extremity and the right common femoral vein.         Has 3+ edema to LLE. No open wound noted to left foot now that feet have been cleaned. Evidence of healed area, but no necrosis or slough noted. Foot XR shows possible foreign bodies in foot, ortho has been consulted. Response to treatment:  Well tolerated by patient. Plan   Plan of Care:    Cont to cleanse bilat feet, apply moisturizer.     Specialty Bed Required : No   [] Low Air Loss   [] Pressure Redistribution  [] Fluid Immersion  [] Bariatric  [] Total Pressure Relief  [] Other:     Current Diet: Diet NPO Effective Now  Dietician consult:  No    Discharge Plan:  Placement for patient upon discharge: home with support    Patient appropriate for Outpatient [address]: No    Ref[facility]:  []   [facility]  [] Supplies  [] Other    Patient/Caregiver Teaching:  Level of patient/caregiver understanding able to:   [] Indicates understanding       [x] Needs reinforcement  [] Unsuccessful      [] Verbal Understanding  [] Demonstrated understanding       [] No evidence of learning  [] Refused teaching         [] N/A       Electronically signed by Danya Mckeon on 7/10/2020 at 11:14 AM

## 2020-07-10 NOTE — PLAN OF CARE
Problem: Pain:  Goal: Pain level will decrease  Description: Pain level will decrease  7/10/2020 0516 by Gómez Jorgensen RN  Outcome: Ongoing  C/o pain to the LLE with activity. Denies pain at rest. No pain medication needed during the night.      Problem: Pain:  Goal: Control of acute pain  Description: Control of acute pain  7/10/2020 0515 by Gómez Jorgensen RN  Outcome: Ongoing     Problem: Pain:  Goal: Control of chronic pain  Description: Control of chronic pain  7/10/2020 0516 by Gómez Jorgensen RN  Outcome: Ongoing

## 2020-07-10 NOTE — TELEPHONE ENCOUNTER
Asked to consult this pt who was admitted for left leg DVT, swollen for 6 weeks. He is known ETOH abuse, unkept. Xray left foot noted tiny foreign bodies. Exam of foot nontender at these areas. Check uric acid otherwise no treatment. Please review foot xrays.

## 2020-07-10 NOTE — PROGRESS NOTES
Pharmacy Medication Reconciliation Note     List of medications patient is currently taking is complete. Source of information:   1. Conversation with patient at bedside. 2. Epic records. ALLERGY  Patient has no known allergies. Notes regarding home medications:   1. Patient denies any prescription, herbal or OTC medications. 2. Patient states he is supposed to be on a cholesterol medication (dispense reports indicates maybe simvastatin), and an aspirin 81 mg, but he reports he does not taken them and hasn't in over a year.      Stephania Starkey, Pharmacy Intern  7/9/2020  10:46 PM

## 2020-07-10 NOTE — PROGRESS NOTES
1239 Patient is persistently asking about order for beers expresses that he does not want to withdraw. Patient is really anxious and agitation increasing CIWA score 14,  Expresses fear of operation. This RN  3 mg of PO Ativan and  notified MD.     This RN prepared patient for procedure. Consent obtained patient showered at 1440, This RN stayed with patient for safety and assistance and PIV dressing changed, flushed and saline locked. 1512 Patient anxious, and quiet in bed. Safety measures in place, nonskid socks on. Call light within reach bed in lowest position.

## 2020-07-10 NOTE — PROGRESS NOTES
4 Eyes Skin Assessment     The patient is being assess for  Admission    I agree that 2 RN's have performed a thorough Head to Toe Skin Assessment on the patient. ALL assessment sites listed below have been assessed. Areas assessed by both nurses:   [x]   Head, Face, and Ears   [x]   Shoulders, Back, and Chest  [x]   Arms, Elbows, and Hands   [x]   Coccyx, Sacrum, and IschIum  [x]   Legs, Feet, and Heels        Does the Patient have Skin Breakdown?   Yes LDA WOUND CARE was Initiated documentation include the Barbara-wound, Wound Assessment, Measurements, Dressing Treatment, Drainage, and Color\",        Alen Prevention initiated:  Yes   Wound Care Orders initiated:  No      WOC nurse consulted for Pressure Injury (Stage 3,4, Unstageable, DTI, NWPT, and Complex wounds), New and Established Ostomies:  Yes      Nurse 1 eSignature: Electronically signed by Ascencion Burr RN on 7/10/20 at 7:52 AM EDT    **SHARE this note so that the co-signing nurse is able to place an eSignature**    Nurse 2 eSignature: {Esignature:[id number]}

## 2020-07-10 NOTE — ED PROVIDER NOTES
**EVALUATED BY ADVANCED PRACTICE PROVIDER**        [address]      Pt Name: Jose Alejandro Dunn  TON:[id number]  Hanygfscooby 1955  Date of evaluation: 7/9/2020  Provider: FUNMI Morales CNP      Chief Complaint:    Chief Complaint   Patient presents with    Leg Pain     leg pain and swelling. onset 6 weeks ago. leg is swollen and red. denies fever       Nursing Notes, Past Medical Hx, Past Surgical Hx, Social Hx, Allergies, and Family Hx were all reviewed and agreed with or any disagreements were addressed in the HPI.    HPI:  (Location, Duration, Timing, Severity, Quality, Assoc Sx, Context, Modifying factors)  This is a  72 y.o. male who presents to the emergency department today complaining of left leg swelling, redness, warmth, and pain. Symptoms have been getting worse for the last 6 weeks. No measured fevers at home. He denies any injury. No numbness or weakness. PastMedical/Surgical History:      Diagnosis Date    Hyperlipidemia     Seizures ([facility] [address]      History reviewed. No pertinent surgical history. Medications:  Previous Medications    No medications on file         Review of Systems:  Review of Systems   Constitutional: Negative for chills, diaphoresis and fever. Respiratory: Negative for cough and shortness of breath. Cardiovascular: Negative for chest pain. Gastrointestinal: Negative for abdominal pain, nausea and vomiting. Musculoskeletal: Positive for joint swelling and myalgias (LLE). Skin: Positive for color change and wound. Allergic/Immunologic: Negative for immunocompromised state. Neurological: Negative for dizziness, weakness, numbness and headaches. Hematological: Negative for adenopathy. Psychiatric/Behavioral: Negative for confusion. All other systems reviewed and are negative. Positives and Pertinent negatives as per HPI.   Except as noted above in the ROS, problem specific ROS was completed and is negative. Physical Exam:  Physical Exam  Vitals signs and nursing note reviewed. Constitutional:       General: He is not in acute distress. Appearance: Normal appearance. He is well-developed. He is not toxic-appearing. Comments: Unkempt 70-year-old male with poor body hygiene lying in bed in no acute distress. HENT:      Head: Normocephalic and atraumatic. Eyes:      General: No scleral icterus. Conjunctiva/sclera: Conjunctivae normal.   Neck:      Musculoskeletal: Normal range of motion. Vascular: No JVD. Cardiovascular:      Rate and Rhythm: Normal rate and regular rhythm. Heart sounds: No murmur. No friction rub. No gallop. Pulmonary:      Effort: Pulmonary effort is normal. No respiratory distress. Breath sounds: Normal breath sounds. Abdominal:      General: There is no distension. Palpations: Abdomen is soft. Abdomen is not rigid. Tenderness: There is no abdominal tenderness. Musculoskeletal: Normal range of motion. Skin:     General: Skin is warm and dry. Capillary Refill: Capillary refill takes less than 2 seconds. Findings: Erythema and lesion present. Comments: Redness, warmth, and swelling to the entire LLE. DP pulse is dopplerable. Muscles are soft without signs of compartment syndrome. Wounds on left foot that appear chronic. Neurological:      General: No focal deficit present. Mental Status: He is alert and oriented to person, place, and time.    Psychiatric:         Mood and Affect: Mood normal.                     MEDICAL DECISION MAKING    Vitals:    Vitals:    07/09/20 1736 07/09/20 1946 07/09/20 2002 07/09/20 2017   BP: (!) 143/80 (!) 169/85 (!) 135/97 136/84   Pulse: 64   65   Resp: 17   18   Temp: 97.8 °F (36.6 °C)      TempSrc: Oral      SpO2: 96% 100%  100%   Weight: 206 lb 2.1 oz (93.5 kg)      Height: 6' 3\" (1.905 m)          LABS:  Labs Reviewed   BASIC METABOLIC PANEL W/ REFLEX TO MG FOR LOW K - Abnormal; Notable for the following components:       Result Value    Chloride 98 (*)     Glucose 111 (*)     CREATININE 0.6 (*)     All other components within normal limits    Narrative:     Performed at:  [facility]  [address]   Phone [phone]   LACTIC ACID, PLASMA - Abnormal; Notable for the following components:    Lactic Acid 2.2 (*)     All other components within normal limits    Narrative:     Performed at:  [facility]  [address]   Phone [phone]   URINE RT REFLEX TO CULTURE - Abnormal; Notable for the following components:    Clarity, UA CLOUDY (*)     All other components within normal limits    Narrative:     Performed at:  [facility]  [address]   Phone [phone]   HEPATIC FUNCTION PANEL - Abnormal; Notable for the following components:     (*)      (*)     All other components within normal limits    Narrative:     Performed at:  [facility]  [address]   Phone [phone]   URINE DRUG SCREEN - Abnormal; Notable for the following components:    Cannabinoid Scrn, Ur POSITIVE (*)     All other components within normal limits    Narrative:     Performed at:  [facility]  [address]   Phone [phone]   CULTURE, BLOOD 2   CULTURE, BLOOD 1   CBC WITH AUTO DIFFERENTIAL    Narrative:     Performed at:  [facility]  [address]   Phone [phone]   SEDIMENTATION RATE    Narrative:     Performed at:  [facility]  [address]   Phone [phone]   C-REACTIVE PROTEIN    Narrative:     Performed at:  [facility]) - Reversal Use as Directed when concerned about life threatening overdose. NONFORMULARY        NONFORMULARY        SULFACETAMIDE (BLEPH-10) 10 % OPHTHALMIC SOLUTION    Place 1 drop into the right eye 4 times daily Every 6 hours.               (Please note the MDM and HPI sections of this note were completed with a voice recognition program.  Efforts were made to edit the dictations but occasionally words are mis-transcribed.)    Electronically signed, FUNMI Love CNP,           FUNMI Love CNP  07/09/20 9104

## 2020-07-10 NOTE — CONSULTS
[facility] Orthopedic Surgery  Consult Note    This patient is seen in consultation at the request of Dr Corin Cerrato    Reason for Consult: left foot foreign body      CHIEF COMPLAINT:  Left leg pain and swelling    History Obtained From:  patient, electronic medical record    HISTORY OF PRESENT ILLNESS:    The patient is a 72 y.o. male who presents with left leg pain and swelling for about 6 weeks. He denies injury. He admits to ETOH abuse. Pain is described in left leg swelling and with the intensity of moderate. Pain is described as aching, pressure. Discomfort is persistent. He is  Noted with DVT on admission and currently on anticoagulant. Xrays of left foot noted incidental foreign body and ortho was consulted for evaluation. Pt says most pain in calf, no foot    Past Medical History:        Diagnosis Date    Hyperlipidemia     Seizures ([address]        Past Surgical History:    History reviewed. No pertinent surgical history. Social History     Tobacco Use    Smoking status: Current Every Day Smoker     Packs/day: 1.50     Types: Cigarettes    Smokeless tobacco: Never Used   Substance Use Topics    Alcohol use: Yes     Alcohol/week: 56.0 standard drinks     Types: 56 Cans of beer per week       History reviewed. No pertinent family history.         Current Medications:   Current Facility-Administered Medications: sodium chloride flush 0.9 % injection 10 mL, 10 mL, Intravenous, 2 times per day  sodium chloride flush 0.9 % injection 10 mL, 10 mL, Intravenous, PRN  acetaminophen (TYLENOL) tablet 650 mg, 650 mg, Oral, Q6H PRN **OR** acetaminophen (TYLENOL) suppository 650 mg, 650 mg, Rectal, Q6H PRN  polyethylene glycol (GLYCOLAX) packet 17 g, 17 g, Oral, Daily PRN  vitamin B-1 (THIAMINE) tablet 100 mg, 100 mg, Oral, Daily  multivitamin 1 tablet, 1 tablet, Oral, Daily  ondansetron (ZOFRAN-ODT) disintegrating tablet 4 mg, 4 mg, Oral, Q8H PRN **OR** ondansetron (ZOFRAN) injection 4 mg, 4 mg, Intravenous, Q6H PRN  LORazepam (ATIVAN) tablet 1 mg, 1 mg, Oral, Q1H PRN **OR** LORazepam (ATIVAN) injection 1 mg, 1 mg, Intravenous, Q1H PRN **OR** LORazepam (ATIVAN) tablet 2 mg, 2 mg, Oral, Q1H PRN **OR** LORazepam (ATIVAN) injection 2 mg, 2 mg, Intravenous, Q1H PRN **OR** LORazepam (ATIVAN) tablet 3 mg, 3 mg, Oral, Q1H PRN **OR** LORazepam (ATIVAN) injection 3 mg, 3 mg, Intravenous, Q1H PRN **OR** LORazepam (ATIVAN) tablet 4 mg, 4 mg, Oral, Q1H PRN **OR** LORazepam (ATIVAN) injection 4 mg, 4 mg, Intravenous, Q1H PRN  enoxaparin (LOVENOX) injection 90 mg, 1 mg/kg, Subcutaneous, BID  folic acid (FOLVITE) tablet 1 mg, 1 mg, Oral, Daily  Allergies:   [id number]     REVIEW OF SYSTEMS:    CONSTITUTIONAL:  negative for  fevers, chills and malaise  MUSCULOSKELETAL:  positive for  myalgias, arthralgias and pain  All other ROS reviewed in chart or with patient or family and are grossly negative. PHYSICAL EXAM:    VITALS:  /63   Pulse 72   Temp 97.8 °F (36.6 °C) (Oral)   Resp 14   Ht 6' 3\" (1.905 m)   Wt 206 lb 12.7 oz (93.8 kg)   SpO2 94%   BMI 25.85 kg/m²     MUSCULOSKELETAL:  left foot NVI. Wiggles toes to command. Pedal pulses are palpable. Left lower leg with gross swelling and erythema. Feet in poor hygiene with scaling and dirt, nails overgrown and discolored.    NEUROLOGIC:   Sensory:    Touch:                                         Skin warm and dry  Resp deep and easy  Abdomen soft and round  Pulse is with regular rate and rhythm    DATA:    CBC:   Lab Results   Component Value Date    WBC 5.0 07/10/2020    RBC 4.50 07/10/2020    HGB 14.1 07/10/2020    HCT 42.8 07/10/2020    MCV 95.2 07/10/2020    MCH 31.4 07/10/2020    MCHC 33.0 07/10/2020    RDW 13.8 07/10/2020     07/10/2020    MPV 8.7 07/10/2020     WBC:    Lab Results   Component Value Date    WBC 5.0 07/10/2020     PT/INR:  No results found for: PROTIME, INR  PTT:  No results found for: APTT[APTT  Radiology Review:    Narrative EXAMINATION:   THREE XRAY VIEWS OF THE LEFT FOOT       7/9/2020 10:35 pm       COMPARISON:   None available       HISTORY:   ORDERING SYSTEM PROVIDED HISTORY: foot wounds   TECHNOLOGIST PROVIDED HISTORY:   Reason for exam:->foot wounds   Reason for Exam: foot wounds       FINDINGS:   Large amount of soft tissue swelling surrounds the foot.  The bones show   degenerative changes, particularly of the tarsometatarsal joints, 1st   metatarsophalangeal joint, and interphalangeal joints, with joint space loss   articular sclerosis and spurring.  No acute fracture or dislocation is   identified.       There are a few punctate radiopaque foreign bodies which project over the   plantar and lateral soft tissues, possibly overlying the patient.       Arterial vascular calcifications are seen within the soft tissues.           Impression   Large amount of soft tissue swelling with no acute or focal bony abnormality   identified.       There are couple of punctate radiopaque foreign bodies with project over the   plantar and lateral soft tissues, possibly overlying the patient. Correlation is recommended. IMPRESSION/RECOMMENDATIONS:    Left leg DVT  Left leg pain  Left leg swelling  Left foot foreign body  Exam of left foot does not confirm any suspicion for infection at site of foreign bodies noted on xrays. ETOH abuse, will check uric acid. Small chance FB is crystals.    Discussed with Dr Luly Valadez via [facility]  7/10/2020  1:14 PM

## 2020-07-10 NOTE — PROGRESS NOTES
I received report over phone from [address] , [facility]. 1628--Pt to room. A&Ox4 in bed. EKOS, heparin, TPA, and coolant running through L popliteal venous sheath. [place] EKOS light flashing. 1930--Handoff report given to Nahomy Pearce RN. Pt sleeping in bed. [place] EKOS light flashing.       Electronically signed by Gio Khanna RN on 7/10/2020 at 7:38 PM

## 2020-07-10 NOTE — CONSULTS
[address]  Cardiology Consult    Marlo Boast  [birth year]    July 10, 2020      Reason for Referral: DVT    CC: Left lower extremity swelling      Subjective:     History of Present Illness:    Marlo Boast is a 72 y.o. patient with a PMH significant for peripheral artery disease, carotid artery disease, hyperlipidemia presented with complaints of left lower extremity swelling. Location. Left lower extremity severe edema  Quality. Severe swelling with moderate pain diffuse and below the knee  Severity. Pain 4/10 constant not relieved. Increasing redness. Duration. 1 month  Timing. No clear association  Context. No aggravating or relieving factors  Modifying factors. No relieving factors  Associated signs and symptoms. No shortness of breath chest pain palpitations or diaphoresis noted    Past Medical History:   has a past medical history of Hyperlipidemia and Seizures ([address] Surgical History:   has no past surgical history on file. Social History:   reports that he has been smoking cigarettes. He has been smoking about 1.50 packs per day. He has never used smokeless tobacco. He reports current alcohol use of about 56.0 standard drinks of alcohol per week. He reports current drug use. Drugs: Marijuana, IV, and Opiates . Family History:  family history is not on file.     Home Medications:  Were reviewed and are listed in nursing record and/or below  Prior to Admission medications    Not on File        CURRENT Medications:  sodium chloride flush 0.9 % injection 10 mL, 2 times per day  sodium chloride flush 0.9 % injection 10 mL, PRN  acetaminophen (TYLENOL) tablet 650 mg, Q6H PRN    Or  acetaminophen (TYLENOL) suppository 650 mg, Q6H PRN  polyethylene glycol (GLYCOLAX) packet 17 g, Daily PRN  vitamin B-1 (THIAMINE) tablet 100 mg, Daily  multivitamin 1 tablet, Daily  ondansetron (ZOFRAN-ODT) disintegrating tablet 4 mg, Q8H PRN    Or  ondansetron (ZOFRAN) injection 4 mg, Q6H PRN  LORazepam (ATIVAN) tablet 1 mg, Q1H PRN    Or  LORazepam (ATIVAN) injection 1 mg, Q1H PRN    Or  LORazepam (ATIVAN) tablet 2 mg, Q1H PRN    Or  LORazepam (ATIVAN) injection 2 mg, Q1H PRN    Or  LORazepam (ATIVAN) tablet 3 mg, Q1H PRN    Or  LORazepam (ATIVAN) injection 3 mg, Q1H PRN    Or  LORazepam (ATIVAN) tablet 4 mg, Q1H PRN    Or  LORazepam (ATIVAN) injection 4 mg, Q1H PRN  enoxaparin (LOVENOX) injection 90 mg, BID  folic acid (FOLVITE) tablet 1 mg, Daily        Allergies:  Patient has no known allergies. Review of Systems: SEE HPI   · Constitutional: no unanticipated weight loss. There's been no change in energy level, sleep pattern, or activity level. No fevers, chills. · Eyes: No visual changes or diplopia. No scleral icterus. · ENT: No Headaches, hearing loss or vertigo. No mouth sores or sore throat. · Cardiovascular: No Chest pain, tightness or discomfort.  No Shortness of breath. No Dyspnea on exertion, Orthopnea, Paroxysmal nocturnal dyspnea or breathlessness at rest.   No Palpitations.  No Syncope ('blackouts', 'faints', 'collapse') or dizziness. · Respiratory: No cough or wheezing, no sputum production. No hematemesis. · Gastrointestinal: No abdominal pain, appetite loss, blood in stools. No change in bowel or bladder habits. · Genitourinary: No dysuria, trouble voiding, or hematuria. · Musculoskeletal:  No gait disturbance, no joint complaints. · Integumentary: No rash or pruritis. · Neurological: No headache, diplopia, change in muscle strength, numbness or tingling. · Psychiatric: No anxiety or depression. · Endocrine: No temperature intolerance. No excessive thirst, fluid intake, or urination. No tremor. · Hematologic/Lymphatic: No abnormal bruising or bleeding, blood clots or swollen lymph nodes. · Allergic/Immunologic: No nasal congestion or hives.       Objective:     PHYSICAL EXAM:      Vitals:    07/10/20 0830   BP:    Pulse: 72   Resp:    Temp: SpO2:     Weight: 206 lb 12.7 oz (93.8 kg)       General Appearance:  Alert, cooperative, no distress, appears stated age. Head:  Normocephalic, without obvious abnormality, atraumatic. Eyes:  Pupils equal and round. No scleral icterus. Mouth: Moist mucosa, no pharyngeal erythema. Nose: Nares normal. No drainage or sinus tenderness. Neck: Supple, symmetrical, trachea midline. No adenopathy. No tenderness/mass/nodules. No carotid bruit or elevated JVD. Lungs:   Respiratory Effort: Normal   Auscultation: Clear to auscultation bilaterally, respirations unlabored. No wheeze, rales   Chest Wall:  No tenderness or deformity. Cardiovascular:    Pulses  Palpation: normal   Ascultation: Regular rate, S1/ S2 normal. No murmur, rub, or gallop. 2+ radial and pedal pulses, symmetric  Carotid  Femoral   Abdomen and Gastrointestinal:   Soft, non-tender, bowel sounds active. Liver and Spleen  Masses   Musculoskeletal: No muscle wasting  Back  Gait   Extremities:  Left lower extremity swelling redness        Skin: Inspection and palpation performed, no rashes or lesions. Pysch: Normal mood and affect.  Alert and oriented to time place person   Neurologic: Normal gross motor and sensory exam.       Labs     All labs have been reviewed    Lab Results   Component Value Date    WBC 5.0 07/10/2020    RBC 4.50 07/10/2020    HGB 14.1 07/10/2020    HCT 42.8 07/10/2020    MCV 95.2 07/10/2020    RDW 13.8 07/10/2020     07/10/2020     Lab Results   Component Value Date     07/10/2020    K 4.0 07/10/2020    K 4.1 07/09/2020    CL 93 07/10/2020    CO2 28 07/10/2020    BUN 6 07/10/2020    CREATININE <0.5 07/10/2020    GFRAA >60 07/10/2020    AGRATIO 1.2 07/10/2020    LABGLOM >60 07/10/2020    GLUCOSE 87 07/10/2020    PROT 6.9 07/10/2020    CALCIUM 9.2 07/10/2020    BILITOT 0.6 07/10/2020    ALKPHOS 99 07/10/2020    AST 93 07/10/2020    ALT 95 07/10/2020     No results found for: PTINR  No results found for: LABA1C  No results found for: CKTOTAL, CKMB, CKMBINDEX, TROPONINI    Cardiac, Vascular and Imaging Data all Personally Reviewed in Detail by Myself        Other imaging:     Left   - Acute versus subacute totally occluding deep vein thrombosis involving the   left external iliac vein, common femoral, profunda, femoral, posterior   tibials, perennials and anterior tibial veins.   - There is acute versus subacute partially occluding deep vein thrombosis   involving the left popliteal vein. - Acute totally occluding superficial venous thrombosis involving the left   greater saphenous vein from zone 1 to zone 8 (the entire length of the leg). - Calf veins were not well visualized on the left due to edema.   - There is no other evidence of deep vein or superficial vein thrombosis   involving the left lower extremity and the right common femoral vein. Assessment and Plan     -Acute to subacute left lower extremity deep vein thrombosis involving left iliac vein common femoral and profunda femoris veins. Phlegmasia left lower extremity. Increased swelling. Plan catheter directed intervention with thrombectomy. Intervention will be done to relieve symptoms also to relieve phlegmasia    I had a detail discussion with the patient and the family members regarding the risk and benefit of the procedures. I explained to them the details of the procedure. I explained to them that the procedure will be performed using moderate sedation and local anaesthesia using lidocaine. I explained any risk of bleeding, perforation of the vessel, stroke, myocardial infarction or death. The patient and the family members understood the risk and benefits and the details of procedure and would like to go ahead with the procedure. The patient gave informed consent. Carotid stenosis history patient will follow-up with me in the office for repeat Doppler and work-up.     Thank you for allowing us to participate in the care of Isabela Huang [address] [place] [address]. Please do not hesitate to contact me if you have any questions.     Francisco Larkin MD, MPH    [facility], [address] [place] [address] [address]  Ph: [phone]  Fax: [phone]    7/10/2020 1:52 PM

## 2020-07-11 LAB
A/G RATIO: 1.4 (ref 1.1–2.2)
ALBUMIN SERPL-MCNC: 3.3 G/DL (ref 3.4–5)
ALP BLD-CCNC: 87 U/L (ref 40–129)
ALT SERPL-CCNC: 60 U/L (ref 10–40)
ANION GAP SERPL CALCULATED.3IONS-SCNC: 12 MMOL/L (ref 3–16)
APTT: 106.1 SEC (ref 24.2–36.2)
APTT: 53.2 SEC (ref 24.2–36.2)
APTT: 55.5 SEC (ref 24.2–36.2)
APTT: 62.1 SEC (ref 24.2–36.2)
AST SERPL-CCNC: 57 U/L (ref 15–37)
BILIRUB SERPL-MCNC: 0.3 MG/DL (ref 0–1)
BUN BLDV-MCNC: 5 MG/DL (ref 7–20)
CALCIUM SERPL-MCNC: 8.2 MG/DL (ref 8.3–10.6)
CHLORIDE BLD-SCNC: 97 MMOL/L (ref 99–110)
CO2: 26 MMOL/L (ref 21–32)
CREAT SERPL-MCNC: 0.5 MG/DL (ref 0.8–1.3)
FIBRINOGEN: 102 MG/DL (ref 200–397)
FIBRINOGEN: 122 MG/DL (ref 200–397)
FIBRINOGEN: 199 MG/DL (ref 200–397)
FIBRINOGEN: 204 MG/DL (ref 200–397)
GFR AFRICAN AMERICAN: >60
GFR NON-AFRICAN AMERICAN: >60
GLOBULIN: 2.4 G/DL
GLUCOSE BLD-MCNC: 155 MG/DL (ref 70–99)
HCT VFR BLD CALC: 35.6 % (ref 40.5–52.5)
HCT VFR BLD CALC: 36.2 % (ref 40.5–52.5)
HCT VFR BLD CALC: 36.7 % (ref 40.5–52.5)
HCT VFR BLD CALC: 37.2 % (ref 40.5–52.5)
HEMOGLOBIN: 11.8 G/DL (ref 13.5–17.5)
HEMOGLOBIN: 12 G/DL (ref 13.5–17.5)
HEMOGLOBIN: 12.3 G/DL (ref 13.5–17.5)
HEMOGLOBIN: 12.4 G/DL (ref 13.5–17.5)
INR BLD: 0.95 (ref 0.86–1.14)
INR BLD: 0.97 (ref 0.86–1.14)
INR BLD: 0.98 (ref 0.86–1.14)
INR BLD: 1.02 (ref 0.86–1.14)
MCH RBC QN AUTO: 31.4 PG (ref 26–34)
MCH RBC QN AUTO: 31.5 PG (ref 26–34)
MCH RBC QN AUTO: 31.7 PG (ref 26–34)
MCH RBC QN AUTO: 31.9 PG (ref 26–34)
MCHC RBC AUTO-ENTMCNC: 33 G/DL (ref 31–36)
MCHC RBC AUTO-ENTMCNC: 33.1 G/DL (ref 31–36)
MCHC RBC AUTO-ENTMCNC: 33.3 G/DL (ref 31–36)
MCHC RBC AUTO-ENTMCNC: 33.5 G/DL (ref 31–36)
MCV RBC AUTO: 94.9 FL (ref 80–100)
MCV RBC AUTO: 95.3 FL (ref 80–100)
PDW BLD-RTO: 13.6 % (ref 12.4–15.4)
PDW BLD-RTO: 13.8 % (ref 12.4–15.4)
PDW BLD-RTO: 13.9 % (ref 12.4–15.4)
PDW BLD-RTO: 14 % (ref 12.4–15.4)
PLATELET # BLD: 106 K/UL (ref 135–450)
PLATELET # BLD: 106 K/UL (ref 135–450)
PLATELET # BLD: 111 K/UL (ref 135–450)
PLATELET # BLD: 111 K/UL (ref 135–450)
PMV BLD AUTO: 8.2 FL (ref 5–10.5)
PMV BLD AUTO: 8.3 FL (ref 5–10.5)
PMV BLD AUTO: 8.3 FL (ref 5–10.5)
PMV BLD AUTO: 9.1 FL (ref 5–10.5)
POTASSIUM SERPL-SCNC: 4 MMOL/L (ref 3.5–5.1)
PROTHROMBIN TIME: 11 SEC (ref 10–13.2)
PROTHROMBIN TIME: 11.2 SEC (ref 10–13.2)
PROTHROMBIN TIME: 11.4 SEC (ref 10–13.2)
PROTHROMBIN TIME: 11.8 SEC (ref 10–13.2)
RBC # BLD: 3.75 M/UL (ref 4.2–5.9)
RBC # BLD: 3.8 M/UL (ref 4.2–5.9)
RBC # BLD: 3.85 M/UL (ref 4.2–5.9)
RBC # BLD: 3.9 M/UL (ref 4.2–5.9)
REASON FOR REJECTION: NORMAL
REJECTED TEST: NORMAL
SODIUM BLD-SCNC: 135 MMOL/L (ref 136–145)
TOTAL PROTEIN: 5.7 G/DL (ref 6.4–8.2)
WBC # BLD: 4.5 K/UL (ref 4–11)
WBC # BLD: 4.5 K/UL (ref 4–11)
WBC # BLD: 5 K/UL (ref 4–11)
WBC # BLD: 9.2 K/UL (ref 4–11)

## 2020-07-11 PROCEDURE — 85730 THROMBOPLASTIN TIME PARTIAL: CPT

## 2020-07-11 PROCEDURE — 96376 TX/PRO/DX INJ SAME DRUG ADON: CPT

## 2020-07-11 PROCEDURE — 85027 COMPLETE CBC AUTOMATED: CPT

## 2020-07-11 PROCEDURE — 2100000000 HC CCU R&B

## 2020-07-11 PROCEDURE — 99214 OFFICE O/P EST MOD 30 MIN: CPT | Performed by: NURSE PRACTITIONER

## 2020-07-11 PROCEDURE — 6370000000 HC RX 637 (ALT 250 FOR IP): Performed by: PEDIATRICS

## 2020-07-11 PROCEDURE — 6360000002 HC RX W HCPCS: Performed by: INTERNAL MEDICINE

## 2020-07-11 PROCEDURE — 85384 FIBRINOGEN ACTIVITY: CPT

## 2020-07-11 PROCEDURE — 6360000002 HC RX W HCPCS: Performed by: HOSPITALIST

## 2020-07-11 PROCEDURE — 96366 THER/PROPH/DIAG IV INF ADDON: CPT

## 2020-07-11 PROCEDURE — 6370000000 HC RX 637 (ALT 250 FOR IP): Performed by: HOSPITALIST

## 2020-07-11 PROCEDURE — 96365 THER/PROPH/DIAG IV INF INIT: CPT

## 2020-07-11 PROCEDURE — 2580000003 HC RX 258: Performed by: INTERNAL MEDICINE

## 2020-07-11 PROCEDURE — 96375 TX/PRO/DX INJ NEW DRUG ADDON: CPT

## 2020-07-11 PROCEDURE — 85610 PROTHROMBIN TIME: CPT

## 2020-07-11 PROCEDURE — 6360000002 HC RX W HCPCS: Performed by: NURSE PRACTITIONER

## 2020-07-11 PROCEDURE — 2580000003 HC RX 258: Performed by: PEDIATRICS

## 2020-07-11 PROCEDURE — 80053 COMPREHEN METABOLIC PANEL: CPT

## 2020-07-11 RX ORDER — FENTANYL CITRATE 50 UG/ML
25 INJECTION, SOLUTION INTRAMUSCULAR; INTRAVENOUS EVERY 6 HOURS PRN
Status: DISCONTINUED | OUTPATIENT
Start: 2020-07-11 | End: 2020-07-11

## 2020-07-11 RX ORDER — OXYCODONE HYDROCHLORIDE 10 MG/1
10 TABLET ORAL EVERY 4 HOURS PRN
Status: DISCONTINUED | OUTPATIENT
Start: 2020-07-11 | End: 2020-07-12

## 2020-07-11 RX ORDER — FENTANYL CITRATE 50 UG/ML
50 INJECTION, SOLUTION INTRAMUSCULAR; INTRAVENOUS EVERY 4 HOURS PRN
Status: DISCONTINUED | OUTPATIENT
Start: 2020-07-11 | End: 2020-07-16 | Stop reason: HOSPADM

## 2020-07-11 RX ADMIN — SODIUM CHLORIDE, PRESERVATIVE FREE 10 ML: 5 INJECTION INTRAVENOUS at 09:01

## 2020-07-11 RX ADMIN — ALTEPLASE 1 MG/HR: 2.2 INJECTION, POWDER, LYOPHILIZED, FOR SOLUTION INTRAVENOUS at 16:44

## 2020-07-11 RX ADMIN — SODIUM CHLORIDE: 9 INJECTION, SOLUTION INTRAVENOUS at 04:15

## 2020-07-11 RX ADMIN — HYDROMORPHONE HYDROCHLORIDE 1 MG: 1 INJECTION, SOLUTION INTRAMUSCULAR; INTRAVENOUS; SUBCUTANEOUS at 20:17

## 2020-07-11 RX ADMIN — HYDROMORPHONE HYDROCHLORIDE 1 MG: 1 INJECTION, SOLUTION INTRAMUSCULAR; INTRAVENOUS; SUBCUTANEOUS at 23:21

## 2020-07-11 RX ADMIN — HYDROMORPHONE HYDROCHLORIDE 1 MG: 1 INJECTION, SOLUTION INTRAMUSCULAR; INTRAVENOUS; SUBCUTANEOUS at 14:05

## 2020-07-11 RX ADMIN — OXYCODONE HYDROCHLORIDE 10 MG: 10 TABLET ORAL at 22:17

## 2020-07-11 RX ADMIN — THERA TABS 1 TABLET: TAB at 09:00

## 2020-07-11 RX ADMIN — OXYCODONE HYDROCHLORIDE 10 MG: 10 TABLET ORAL at 18:17

## 2020-07-11 RX ADMIN — ACETAMINOPHEN 650 MG: 325 TABLET ORAL at 21:16

## 2020-07-11 RX ADMIN — SODIUM CHLORIDE: 9 INJECTION, SOLUTION INTRAVENOUS at 16:45

## 2020-07-11 RX ADMIN — FOLIC ACID 1 MG: 1 TABLET ORAL at 09:00

## 2020-07-11 RX ADMIN — FENTANYL CITRATE 25 MCG: 50 INJECTION, SOLUTION INTRAMUSCULAR; INTRAVENOUS at 13:34

## 2020-07-11 RX ADMIN — Medication 100 MG: at 09:00

## 2020-07-11 RX ADMIN — HYDROMORPHONE HYDROCHLORIDE 1 MG: 1 INJECTION, SOLUTION INTRAMUSCULAR; INTRAVENOUS; SUBCUTANEOUS at 17:08

## 2020-07-11 RX ADMIN — DIPHENHYDRAMINE HCL 25 MG: 25 TABLET ORAL at 21:17

## 2020-07-11 ASSESSMENT — PAIN DESCRIPTION - FREQUENCY
FREQUENCY: CONTINUOUS

## 2020-07-11 ASSESSMENT — PAIN DESCRIPTION - PAIN TYPE
TYPE: ACUTE PAIN

## 2020-07-11 ASSESSMENT — PAIN DESCRIPTION - DIRECTION
RADIATING_TOWARDS: LEFT FOOT

## 2020-07-11 ASSESSMENT — PAIN SCALES - GENERAL
PAINLEVEL_OUTOF10: 6
PAINLEVEL_OUTOF10: 10
PAINLEVEL_OUTOF10: 9
PAINLEVEL_OUTOF10: 10
PAINLEVEL_OUTOF10: 6
PAINLEVEL_OUTOF10: 10
PAINLEVEL_OUTOF10: 0
PAINLEVEL_OUTOF10: 10
PAINLEVEL_OUTOF10: 9
PAINLEVEL_OUTOF10: 7
PAINLEVEL_OUTOF10: 6
PAINLEVEL_OUTOF10: 0

## 2020-07-11 ASSESSMENT — PAIN DESCRIPTION - ONSET
ONSET: ON-GOING

## 2020-07-11 ASSESSMENT — PAIN DESCRIPTION - ORIENTATION
ORIENTATION: LEFT

## 2020-07-11 ASSESSMENT — PAIN - FUNCTIONAL ASSESSMENT
PAIN_FUNCTIONAL_ASSESSMENT: PREVENTS OR INTERFERES SOME ACTIVE ACTIVITIES AND ADLS

## 2020-07-11 ASSESSMENT — PAIN DESCRIPTION - PROGRESSION
CLINICAL_PROGRESSION: NOT CHANGED
CLINICAL_PROGRESSION: NOT CHANGED
CLINICAL_PROGRESSION: RAPIDLY WORSENING
CLINICAL_PROGRESSION: NOT CHANGED
CLINICAL_PROGRESSION: NOT CHANGED
CLINICAL_PROGRESSION: RAPIDLY WORSENING
CLINICAL_PROGRESSION: GRADUALLY WORSENING
CLINICAL_PROGRESSION: RAPIDLY WORSENING
CLINICAL_PROGRESSION: GRADUALLY WORSENING
CLINICAL_PROGRESSION: GRADUALLY WORSENING
CLINICAL_PROGRESSION: RAPIDLY WORSENING
CLINICAL_PROGRESSION: NOT CHANGED

## 2020-07-11 ASSESSMENT — PAIN DESCRIPTION - DESCRIPTORS
DESCRIPTORS: ACHING;SORE

## 2020-07-11 ASSESSMENT — PAIN DESCRIPTION - LOCATION
LOCATION: LEG

## 2020-07-11 NOTE — PROGRESS NOTES
Pt found lying on side with L knee slightly bent. Pt returned to supine position and reminded not to bend his knee. Pt stated that he would try not to. Oozing noticed underneath bandage. Pad placed beneath sheath to gauge rate of oozing from the sheath.

## 2020-07-11 NOTE — PROGRESS NOTES
Cardiology - PROGRESS NOTE    Admit Date: 7/9/2020     Reason for follow up: DVT    72 y.o. patient with a PMH significant for peripheral artery disease, carotid artery disease, hyperlipidemia presented with complaints of left lower extremity swelling. Social History:   reports that he has been smoking cigarettes. He has been smoking about 1.50 packs per day. He has never used smokeless tobacco. He reports current alcohol use of about 56.0 standard drinks of alcohol per week. He reports current drug use. Drugs: Marijuana, IV, and Opiates . Family History: family history is not on file. Interval History:   Patient seen and examined and notes reviewed   In bed   NAD   ECOS cath in place   Mild LLE pain     All other systems reviewed and negative except as above.         Diet: DIET GENERAL;  Pain is:Mild  Nausea:None    In: 836 [P.O.:355]  Out: 525    Wt Readings from Last 3 Encounters:   07/10/20 211 lb 10.3 oz (96 kg)   07/17/16 151 lb 14.4 oz (68.9 kg)           Data:   Scheduled Meds:   Scheduled Meds:   sodium chloride flush  10 mL Intravenous 2 times per day    thiamine  100 mg Oral Daily    multivitamin  1 tablet Oral Daily    folic acid  1 mg Oral Daily     Continuous Infusions:   alteplase (ACTIVASE) in 0.9% sodium chloride infusion (EKOS catheter) 1 mg/hr (07/10/20 1834)    heparin (porcine) 250 Units/hr (07/10/20 1834)    sodium chloride 35 mL/hr at 07/10/20 1833     PRN Meds:.sodium chloride flush, acetaminophen **OR** acetaminophen, polyethylene glycol, ondansetron **OR** ondansetron, diphenhydrAMINE  Continuous Infusions:   alteplase (ACTIVASE) in 0.9% sodium chloride infusion (EKOS catheter) 1 mg/hr (07/10/20 1834)    heparin (porcine) 250 Units/hr (07/10/20 1834)    sodium chloride 35 mL/hr at 07/10/20 1833       Intake/Output Summary (Last 24 hours) at 7/11/2020 1021  Last data filed at 7/11/2020 1005  Gross per 24 hour Intake 365 ml   Output 525 ml   Net -160 ml       CBC:   Recent Labs     07/11/20  0618   WBC 4.5   HGB 12.3*   *     BMP:  Recent Labs     07/10/20  1246      K 4.0   CL 93*   CO2 28   BUN 6*   CREATININE <0.5*   GLUCOSE 87     ABGs: No results found for: PHART, PO2ART, [id number]  INR:   Recent Labs     07/10/20  1850 07/11/20  0006 07/11/20  0618   INR 0.98 0.97 0.95        CARDIAC LABS     ENZYMES:No results for input(s): CKMB, CKMBINDEX, TROPONINI in the last 72 hours. Invalid input(s): CKTOTAL;3  FASTING LIPID PANEL:No results found for: HDL, LDLDIRECT, LDLCALC, TRIG  LIVER PROFILE:  Recent Labs     07/09/20  1750 07/10/20  1246   * 93*   * 95*       -----------------------------------------------------------------  Telemetry: personally reviewed     RAD:    VL LOWER      Summary          - Acute versus subacute totally occluding deep vein thrombosis involving the     left external iliac vein, common femoral, profunda, femoral, posterior     tibials, perennials and anterior tibial veins.             Echo: None     PROCEDURES PERFORMED:  1. Left lower extremity venography, iliac and femoral vein venography,  left lower extremity. 2.  Balloon angioplasty of left femoral vein. 4.  Insertion of EKOS tPA catheter from iliac vein into the femoral  vein. Objective:   Vitals: /86   Pulse 69   Temp 98.5 °F (36.9 °C) (Oral)   Resp 21   Ht 6' 3\" (1.905 m)   Wt 211 lb 10.3 oz (96 kg)   SpO2 97%   BMI 26.45 kg/m²   General appearance: alert, chronically ill appearing, appears stated age and cooperative, No acute distress   Skin: Skin color, texture, turgor normal. No rashes or ecchymosis. HEENT: Head: Normocephalic, no lesions, without obvious abnormality.   Neck: no adenopathy, no carotid bruit, no JVD, supple, symmetrical, trachea midline and thyroid not enlarged, symmetric, no tenderness/mass/nodules  Lungs: diminished breath sounds bilaterally, no accessory muscle use, no respiratory distress, on RA  Heart: regularly irregular rhythm and S1, S2 normal  Abdomen: soft, non-tender; bowel sounds normal; no masses,  no organomegaly  Extremities: RLE DP/PT +1/+1, LLE-ECOS cath in place-popiteal access, edema +3 tight, distal foot-good cap refill  , pulses: DP +2/+2, PT +2/+2  Neurologic: Mental status: Alert, oriented, thought content appropriate, no tremors, no gross sensory motor deficit,   Psychiatric: normal insight and affect      Assessment & Plan:    Patient Active Problem List:     Localized swelling of left lower leg     Acute deep vein thrombosis (DVT) of iliac vein of left lower extremity ([address]        Plan:  1. DVT   Left iliac    ECOS in place    Continue    Q6 hr bloodwork    Continue bedrest   2. ETOH abuse   Defer to primary team   3.  Dyslipidemia   Check statins in AM however will likely be unable to start statin secondary to active ETOH        FUNMI Biswas-CNP   [address]  Cardiology   7/11/2020  10:21 AM

## 2020-07-11 NOTE — PROGRESS NOTES
Hospitalist Progress Note    CC:     Leg Pain (leg pain and swelling. onset 6 weeks ago. leg is swollen and red. denies fever)      Admit date: 7/9/2020  Days in hospital:  1    Subjective:     Denies any complaint this morning. No acute events overnight. Developed significant pain in the leg later on in the afternoon. ROS:   Review of Systems   Constitutional: Negative for chills and fever. Respiratory: Negative for cough and shortness of breath. Cardiovascular: Negative for chest pain and palpitations. Gastrointestinal: Negative for abdominal pain, constipation and diarrhea. Genitourinary: Negative for dysuria and urgency. Neurological: Negative for headaches. Objective:    BP (!) 149/95   Pulse 105   Temp 98.6 °F (37 °C) (Oral)   Resp 20   Ht 6' 3\" (1.905 m)   Wt 211 lb 10.3 oz (96 kg)   SpO2 98%   BMI 26.45 kg/m²     Gen: alert, NAD  HEENT: NC/AT, moist mucous membranes, no oropharyngeal erythema or exudate  Neck: supple, trachea midline, no anterior cervical or SC LAD  Heart: Normal s1/s2, RRR, no murmurs, gallops, or rubs. Lungs: CTA bilaterally, no wheezing, no rales, no rhonchi, no use of accessory muscles  Abd: bowel sounds present, soft, nondistended, none tender  Extrem: Left leg swollen and tender. Psych: A & O x3  , affect appropriate  Neuro: grossly intact, moves all four extremities spontaneously.       Medications:  Scheduled Meds:   sodium chloride flush  10 mL Intravenous 2 times per day    thiamine  100 mg Oral Daily    multivitamin  1 tablet Oral Daily    folic acid  1 mg Oral Daily       PRN Meds:  HYDROmorphone, sodium chloride flush, acetaminophen **OR** acetaminophen, polyethylene glycol, ondansetron **OR** ondansetron, diphenhydrAMINE    IV:   alteplase (ACTIVASE) in 0.9% sodium chloride infusion (EKOS catheter) 1 mg/hr (07/10/20 1834)    heparin (porcine) 250 Units/hr (07/10/20 1834)    sodium chloride 35 mL/hr at 07/10/20 1833 Intake/Output Summary (Last 24 hours) at 7/11/2020 1405  Last data filed at 7/11/2020 1228  Gross per 24 hour   Intake 355 ml   Output 725 ml   Net -370 ml       Results:  CBC:   Recent Labs     07/11/20  0006 07/11/20  0618 07/11/20  1245   WBC 4.5 4.5 5.0   HGB 11.8* 12.3* 12.4*   HCT 35.6* 36.7* 37.2*   MCV 94.9 95.3 95.3   * 106* 106*     BMP:   Recent Labs     07/09/20  1750 07/10/20  1246 07/11/20  1300    136 135*   K 4.1 4.0 4.0   CL 98* 93* 97*   CO2 26 28 26   BUN 7 6* 5*   CREATININE 0.6* <0.5* 0.5*     Mag: No results for input(s): MAG in the last 72 hours. Phos: No results found for: PHOS  No components found for: GLU    LIVER PROFILE:   Recent Labs     07/09/20  1750 07/10/20  1246 07/11/20  1300   * 93* 57*   * 95* 60*   BILIDIR <0.2  --   --    BILITOT 0.3 0.6 0.3   ALKPHOS 102 99 87     PT/INR:   Recent Labs     07/11/20  0006 07/11/20  0618 07/11/20  1336   PROTIME 11.2 11.0 11.4   INR 0.97 0.95 0.98     APTT:   Recent Labs     07/10/20  1850 07/11/20  0006 07/11/20 0618   APTT 67.0* 62.1* 55.5*     UA:  Recent Labs     07/09/20 2112   COLORU DK YELLOW   PHUR 5.5  5.5   WBCUA 1   RBCUA 1   CLARITYU CLOUDY*   SPECGRAV 1.019   LEUKOCYTESUR Negative   UROBILINOGEN 0.2   BILIRUBINUR Negative   BLOODU Negative   GLUCOSEU Negative       Invalid input(s): ABG  Lab Results   Component Value Date    CALCIUM 8.2 (L) 07/11/2020       Assessment and Plan:    Acute left DVT Involving left external iliac vein, common femoral, profunda, femoral, posterior tibial, perennial, and anterior tibial veins  Status post balloon angioplasty of the left femoral vein. Continue with heparin , and TPA  Plan to return to Cath Lab on Monday for further intervention  Bedrest  IV Dilaudid for pain     Alcohol abuse  Monitor closely for signs of withdrawal   Not interested in quitting. Discontinue CIWA protocol.   Continue with providing beers to prevent withdrawal  Continue  folic acid, thiamine, and multivitamin     Mild acute transaminitis  Most likely secondary to alcohol  Improved      Foreign bodies in left foot:  Seen by Ortho  No plan for any intervention     Code status:  Full code  DVT prophylaxis:  IV heparin  Disposition:   Pending clinical improvement      Electronically signed by Floridalma Cronin MD on 7/11/2020 at 2:05 PM

## 2020-07-11 NOTE — PROCEDURES
[address] [place] [address]                            CARDIAC CATHETERIZATION    PATIENT NAME: Fabien Bennett                   :        1955  MED REC NO:   [id number]                          ROOM:       1616  ACCOUNT NO:   [de-identified]                           ADMIT DATE: 2020  PROVIDER:     Paulette Byers MD    DATE OF PROCEDURE:  07/10/2020    PROCEDURES PERFORMED:  1. Left lower extremity venography, iliac and femoral vein venography,  left lower extremity. 2.  Balloon angioplasty of left femoral vein. 4.  Insertion of EKOS tPA catheter from iliac vein into the femoral  vein. INDICATION FOR PROCEDURE:  Massive deep vein thrombosis phlegmasia. ASA  is II. Mallampati score of II. PROCEDURE IN DETAIL:  The patient was brought to the cardiac cath  laboratory, informed consent was obtained. The patient was placed in  the prone position, accessed left popliteal vein, inserted a 7-[place]ian  sheath using a micropuncture access. Then we performed selective  angiography of the femoral vein after inserting a microcatheter. Then  [address] was inserted crossing the lesion, parked the wire in  the inferior vena cava. Inserted a 6 x 250 mm balloon angioplasty  catheter and performed balloon angioplasty of the femoral vein. Next,  balloon catheter removed. EKOS tPA catheter advanced, parked in the  ostium of the iliac vein into the femoral vein and started tPA at 1  mg/hour. The patient was transferred to CVU in stable condition. Estimated blood  loss less than 30 mL. ANGIOGRAPHIC FINDINGS:  Show complete occlusion of left iliac, femoral,  and popliteal veins with no flow. RECOMMENDATIONS:  1. Continue postop post cath care. 2.  Site care. 3.  Risk factor modification. 4.  TPA will be infused at 1 mg/hour. Heparin 250 units per hour from  the side arm of the sheath.   5.  The patient will return for intervention possibly on Monday.         Xiang Wong MD    D: 07/10/2020 18:08:13       T: 07/10/2020 19:33:38     [id number]  Job#: [id number]     Doc#: [id number]    CC:

## 2020-07-11 NOTE — PLAN OF CARE
Problem: Pain:  Goal: Pain level will decrease  Description: Pain level will decrease  Outcome: Ongoing  Note: Pain medication ordered. Pt states it helps a little. He is able to lay calmly in bed and sleep. Problem: Falls - Risk of:  Goal: Will remain free from falls  Description: Will remain free from falls  Outcome: Ongoing  Note: Bed alarm on. Pt impulsive when in pain. Problem: Bleeding:  Goal: Will show no signs and symptoms of excessive bleeding  Description: Will show no signs and symptoms of excessive bleeding  Outcome: Ongoing  Note: Oozing from EKOS site at times.

## 2020-07-11 NOTE — PROGRESS NOTES
0730--I received handoff report from Rei Musa [facility]. Pt A&OX4 in bed. [place] EKOS light flashing. Old drainage from L popliteal sheath site. 0806--I spoke with Dr. Brinda Machado and beer order was clarified. Order to be placed. 0830--Pt given 1 beer to drink with his breakfast.     0910--I updated cardiac NP about pt's status. 0935--OK to draw CMP with other labs at noon, per Dr. Brinda Machado. 1230--Pt drinking 2 beers with lunch. 1315--Pt says he has very bad pain in L leg. EKOS site WNL. L pedal pulse present to doppler. I notified cards NP.     1357--I notified Dr. Brinda Machado that pt states fentanyl didn't help his leg pain at all.     1411--I notified cardiac NP of pt's lab values. Dr. Maru Petersen also aware and says no new orders. 1643--Pt drinking 1 beer with dinner. 1756--I notified Dr. Brinda Machado that pt says dilaudid isn't helping his leg pain the second time it was given. Pt also keeps asking for beer. I told pt that he received his allotted amount of beer for today and he is unhappy. 1855--Handoff report given to Rei Musa RN. Pt laying with eyes closed in bed. [place] EKOS light flashing. Old drainage from L popliteal sheath site.      Electronically signed by Ely Moser RN on 7/11/2020 at 7:04 PM

## 2020-07-12 PROBLEM — I82.412 ACUTE DEEP VEIN THROMBOSIS (DVT) OF LEFT FEMORAL VEIN (HCC): Status: ACTIVE | Noted: 2020-07-12

## 2020-07-12 LAB
A/G RATIO: 1.4 (ref 1.1–2.2)
ALBUMIN SERPL-MCNC: 3.4 G/DL (ref 3.4–5)
ALP BLD-CCNC: 77 U/L (ref 40–129)
ALT SERPL-CCNC: 46 U/L (ref 10–40)
ANION GAP SERPL CALCULATED.3IONS-SCNC: 11 MMOL/L (ref 3–16)
APTT: 31.3 SEC (ref 24.2–36.2)
APTT: 34.4 SEC (ref 24.2–36.2)
APTT: 36.7 SEC (ref 24.2–36.2)
AST SERPL-CCNC: 38 U/L (ref 15–37)
BILIRUB SERPL-MCNC: 0.3 MG/DL (ref 0–1)
BUN BLDV-MCNC: 6 MG/DL (ref 7–20)
CALCIUM SERPL-MCNC: 7.8 MG/DL (ref 8.3–10.6)
CHLORIDE BLD-SCNC: 96 MMOL/L (ref 99–110)
CHOLESTEROL, TOTAL: 150 MG/DL (ref 0–199)
CO2: 26 MMOL/L (ref 21–32)
CREAT SERPL-MCNC: <0.5 MG/DL (ref 0.8–1.3)
FIBRINOGEN: 101 MG/DL (ref 200–397)
FIBRINOGEN: 105 MG/DL (ref 200–397)
FIBRINOGEN: 105 MG/DL (ref 200–397)
GFR AFRICAN AMERICAN: >60
GFR NON-AFRICAN AMERICAN: >60
GLOBULIN: 2.4 G/DL
GLUCOSE BLD-MCNC: 101 MG/DL (ref 70–99)
HCT VFR BLD CALC: 24 % (ref 40.5–52.5)
HCT VFR BLD CALC: 24.5 % (ref 40.5–52.5)
HCT VFR BLD CALC: 30.5 % (ref 40.5–52.5)
HCT VFR BLD CALC: 31.1 % (ref 40.5–52.5)
HDLC SERPL-MCNC: 57 MG/DL (ref 40–60)
HEMOGLOBIN: 10.1 G/DL (ref 13.5–17.5)
HEMOGLOBIN: 10.5 G/DL (ref 13.5–17.5)
HEMOGLOBIN: 7.8 G/DL (ref 13.5–17.5)
HEMOGLOBIN: 7.9 G/DL (ref 13.5–17.5)
INR BLD: 1.06 (ref 0.86–1.14)
INR BLD: 1.08 (ref 0.86–1.14)
INR BLD: 1.09 (ref 0.86–1.14)
INR BLD: 1.1 (ref 0.86–1.14)
LDL CHOLESTEROL CALCULATED: 76 MG/DL
MCH RBC QN AUTO: 30.8 PG (ref 26–34)
MCH RBC QN AUTO: 31.6 PG (ref 26–34)
MCH RBC QN AUTO: 32 PG (ref 26–34)
MCH RBC QN AUTO: 32.2 PG (ref 26–34)
MCHC RBC AUTO-ENTMCNC: 31.9 G/DL (ref 31–36)
MCHC RBC AUTO-ENTMCNC: 33 G/DL (ref 31–36)
MCHC RBC AUTO-ENTMCNC: 33.1 G/DL (ref 31–36)
MCHC RBC AUTO-ENTMCNC: 33.7 G/DL (ref 31–36)
MCV RBC AUTO: 94.9 FL (ref 80–100)
MCV RBC AUTO: 95.7 FL (ref 80–100)
MCV RBC AUTO: 96.5 FL (ref 80–100)
MCV RBC AUTO: 97.4 FL (ref 80–100)
PDW BLD-RTO: 13.7 % (ref 12.4–15.4)
PDW BLD-RTO: 13.7 % (ref 12.4–15.4)
PDW BLD-RTO: 13.8 % (ref 12.4–15.4)
PDW BLD-RTO: 13.9 % (ref 12.4–15.4)
PLATELET # BLD: 68 K/UL (ref 135–450)
PLATELET # BLD: 70 K/UL (ref 135–450)
PLATELET # BLD: 79 K/UL (ref 135–450)
PLATELET # BLD: 84 K/UL (ref 135–450)
PLATELET SLIDE REVIEW: ABNORMAL
PMV BLD AUTO: 7.9 FL (ref 5–10.5)
PMV BLD AUTO: 8.2 FL (ref 5–10.5)
PMV BLD AUTO: 8.4 FL (ref 5–10.5)
PMV BLD AUTO: 9.1 FL (ref 5–10.5)
POTASSIUM SERPL-SCNC: 3.8 MMOL/L (ref 3.5–5.1)
PROTHROMBIN TIME: 12.3 SEC (ref 10–13.2)
PROTHROMBIN TIME: 12.5 SEC (ref 10–13.2)
PROTHROMBIN TIME: 12.7 SEC (ref 10–13.2)
PROTHROMBIN TIME: 12.8 SEC (ref 10–13.2)
RBC # BLD: 2.47 M/UL (ref 4.2–5.9)
RBC # BLD: 2.53 M/UL (ref 4.2–5.9)
RBC # BLD: 3.18 M/UL (ref 4.2–5.9)
RBC # BLD: 3.27 M/UL (ref 4.2–5.9)
REASON FOR REJECTION: NORMAL
REJECTED TEST: NORMAL
SLIDE REVIEW: ABNORMAL
SODIUM BLD-SCNC: 133 MMOL/L (ref 136–145)
TOTAL PROTEIN: 5.8 G/DL (ref 6.4–8.2)
TRIGL SERPL-MCNC: 87 MG/DL (ref 0–150)
VLDLC SERPL CALC-MCNC: 17 MG/DL
WBC # BLD: 4.7 K/UL (ref 4–11)
WBC # BLD: 5 K/UL (ref 4–11)
WBC # BLD: 6.9 K/UL (ref 4–11)
WBC # BLD: 7.5 K/UL (ref 4–11)

## 2020-07-12 PROCEDURE — 6370000000 HC RX 637 (ALT 250 FOR IP): Performed by: PEDIATRICS

## 2020-07-12 PROCEDURE — 6360000002 HC RX W HCPCS: Performed by: HOSPITALIST

## 2020-07-12 PROCEDURE — 85730 THROMBOPLASTIN TIME PARTIAL: CPT

## 2020-07-12 PROCEDURE — 99214 OFFICE O/P EST MOD 30 MIN: CPT | Performed by: NURSE PRACTITIONER

## 2020-07-12 PROCEDURE — 96375 TX/PRO/DX INJ NEW DRUG ADDON: CPT

## 2020-07-12 PROCEDURE — 80061 LIPID PANEL: CPT

## 2020-07-12 PROCEDURE — 51701 INSERT BLADDER CATHETER: CPT

## 2020-07-12 PROCEDURE — 96376 TX/PRO/DX INJ SAME DRUG ADON: CPT

## 2020-07-12 PROCEDURE — 99223 1ST HOSP IP/OBS HIGH 75: CPT | Performed by: INTERNAL MEDICINE

## 2020-07-12 PROCEDURE — 6360000002 HC RX W HCPCS: Performed by: INTERNAL MEDICINE

## 2020-07-12 PROCEDURE — 80053 COMPREHEN METABOLIC PANEL: CPT

## 2020-07-12 PROCEDURE — 2700000000 HC OXYGEN THERAPY PER DAY

## 2020-07-12 PROCEDURE — 36415 COLL VENOUS BLD VENIPUNCTURE: CPT

## 2020-07-12 PROCEDURE — 6360000002 HC RX W HCPCS: Performed by: PEDIATRICS

## 2020-07-12 PROCEDURE — 2580000003 HC RX 258: Performed by: HOSPITALIST

## 2020-07-12 PROCEDURE — 85027 COMPLETE CBC AUTOMATED: CPT

## 2020-07-12 PROCEDURE — 2500000003 HC RX 250 WO HCPCS: Performed by: HOSPITALIST

## 2020-07-12 PROCEDURE — 2580000003 HC RX 258: Performed by: INTERNAL MEDICINE

## 2020-07-12 PROCEDURE — 2100000000 HC CCU R&B

## 2020-07-12 PROCEDURE — 94761 N-INVAS EAR/PLS OXIMETRY MLT: CPT

## 2020-07-12 PROCEDURE — 85384 FIBRINOGEN ACTIVITY: CPT

## 2020-07-12 PROCEDURE — 6370000000 HC RX 637 (ALT 250 FOR IP): Performed by: HOSPITALIST

## 2020-07-12 PROCEDURE — 51798 US URINE CAPACITY MEASURE: CPT

## 2020-07-12 PROCEDURE — 85610 PROTHROMBIN TIME: CPT

## 2020-07-12 RX ORDER — LORAZEPAM 2 MG/ML
4 INJECTION INTRAMUSCULAR
Status: DISCONTINUED | OUTPATIENT
Start: 2020-07-12 | End: 2020-07-16 | Stop reason: HOSPADM

## 2020-07-12 RX ORDER — LORAZEPAM 1 MG/1
1 TABLET ORAL
Status: DISCONTINUED | OUTPATIENT
Start: 2020-07-12 | End: 2020-07-16 | Stop reason: HOSPADM

## 2020-07-12 RX ORDER — SODIUM CHLORIDE 0.9 % (FLUSH) 0.9 %
10 SYRINGE (ML) INJECTION PRN
Status: DISCONTINUED | OUTPATIENT
Start: 2020-07-12 | End: 2020-07-12

## 2020-07-12 RX ORDER — ACETAMINOPHEN 325 MG/1
650 TABLET ORAL EVERY 6 HOURS PRN
Status: DISCONTINUED | OUTPATIENT
Start: 2020-07-12 | End: 2020-07-12

## 2020-07-12 RX ORDER — SODIUM CHLORIDE 0.9 % (FLUSH) 0.9 %
10 SYRINGE (ML) INJECTION PRN
Status: CANCELLED | OUTPATIENT
Start: 2020-07-12

## 2020-07-12 RX ORDER — POLYETHYLENE GLYCOL 3350 17 G/17G
17 POWDER, FOR SOLUTION ORAL DAILY PRN
Status: DISCONTINUED | OUTPATIENT
Start: 2020-07-12 | End: 2020-07-16 | Stop reason: HOSPADM

## 2020-07-12 RX ORDER — DEXMEDETOMIDINE HYDROCHLORIDE 4 UG/ML
0.2 INJECTION, SOLUTION INTRAVENOUS CONTINUOUS
Status: DISCONTINUED | OUTPATIENT
Start: 2020-07-12 | End: 2020-07-13

## 2020-07-12 RX ORDER — SODIUM CHLORIDE 0.9 % (FLUSH) 0.9 %
10 SYRINGE (ML) INJECTION EVERY 12 HOURS SCHEDULED
Status: CANCELLED | OUTPATIENT
Start: 2020-07-12

## 2020-07-12 RX ORDER — LORAZEPAM 2 MG/ML
3 INJECTION INTRAMUSCULAR
Status: DISCONTINUED | OUTPATIENT
Start: 2020-07-12 | End: 2020-07-16 | Stop reason: HOSPADM

## 2020-07-12 RX ORDER — LORAZEPAM 2 MG/ML
2 INJECTION INTRAMUSCULAR
Status: DISCONTINUED | OUTPATIENT
Start: 2020-07-12 | End: 2020-07-16 | Stop reason: HOSPADM

## 2020-07-12 RX ORDER — LORAZEPAM 2 MG/ML
1 INJECTION INTRAMUSCULAR
Status: DISCONTINUED | OUTPATIENT
Start: 2020-07-12 | End: 2020-07-16 | Stop reason: HOSPADM

## 2020-07-12 RX ORDER — SODIUM CHLORIDE 9 MG/ML
INJECTION, SOLUTION INTRAVENOUS CONTINUOUS
Status: CANCELLED | OUTPATIENT
Start: 2020-07-13

## 2020-07-12 RX ORDER — ACETAMINOPHEN 650 MG/1
650 SUPPOSITORY RECTAL EVERY 6 HOURS PRN
Status: DISCONTINUED | OUTPATIENT
Start: 2020-07-12 | End: 2020-07-12

## 2020-07-12 RX ORDER — SODIUM CHLORIDE 0.9 % (FLUSH) 0.9 %
10 SYRINGE (ML) INJECTION EVERY 12 HOURS SCHEDULED
Status: DISCONTINUED | OUTPATIENT
Start: 2020-07-12 | End: 2020-07-12 | Stop reason: SDUPTHER

## 2020-07-12 RX ORDER — SODIUM CHLORIDE 9 MG/ML
INJECTION, SOLUTION INTRAVENOUS CONTINUOUS
Status: DISCONTINUED | OUTPATIENT
Start: 2020-07-12 | End: 2020-07-16

## 2020-07-12 RX ORDER — LORAZEPAM 1 MG/1
3 TABLET ORAL
Status: DISCONTINUED | OUTPATIENT
Start: 2020-07-12 | End: 2020-07-16 | Stop reason: HOSPADM

## 2020-07-12 RX ORDER — POLYETHYLENE GLYCOL 3350 17 G/17G
17 POWDER, FOR SOLUTION ORAL DAILY PRN
Status: DISCONTINUED | OUTPATIENT
Start: 2020-07-12 | End: 2020-07-12

## 2020-07-12 RX ORDER — LORAZEPAM 1 MG/1
2 TABLET ORAL
Status: DISCONTINUED | OUTPATIENT
Start: 2020-07-12 | End: 2020-07-16 | Stop reason: HOSPADM

## 2020-07-12 RX ORDER — KETOROLAC TROMETHAMINE 15 MG/ML
15 INJECTION, SOLUTION INTRAMUSCULAR; INTRAVENOUS EVERY 6 HOURS PRN
Status: DISCONTINUED | OUTPATIENT
Start: 2020-07-12 | End: 2020-07-16 | Stop reason: HOSPADM

## 2020-07-12 RX ORDER — LORAZEPAM 1 MG/1
4 TABLET ORAL
Status: DISCONTINUED | OUTPATIENT
Start: 2020-07-12 | End: 2020-07-16 | Stop reason: HOSPADM

## 2020-07-12 RX ADMIN — FENTANYL CITRATE 50 MCG: 50 INJECTION INTRAMUSCULAR; INTRAVENOUS at 05:45

## 2020-07-12 RX ADMIN — LORAZEPAM 1 MG: 2 INJECTION INTRAMUSCULAR; INTRAVENOUS at 21:53

## 2020-07-12 RX ADMIN — LORAZEPAM 2 MG: 2 INJECTION INTRAMUSCULAR; INTRAVENOUS at 14:36

## 2020-07-12 RX ADMIN — LORAZEPAM 2 MG: 2 INJECTION INTRAMUSCULAR; INTRAVENOUS at 02:16

## 2020-07-12 RX ADMIN — LORAZEPAM 2 MG: 2 INJECTION INTRAMUSCULAR; INTRAVENOUS at 04:45

## 2020-07-12 RX ADMIN — DEXMEDETOMIDINE HYDROCHLORIDE 0.3 MCG/KG/HR: 4 INJECTION, SOLUTION INTRAVENOUS at 15:08

## 2020-07-12 RX ADMIN — LORAZEPAM 1 MG: 2 INJECTION INTRAMUSCULAR; INTRAVENOUS at 06:15

## 2020-07-12 RX ADMIN — OXYCODONE HYDROCHLORIDE 10 MG: 10 TABLET ORAL at 02:55

## 2020-07-12 RX ADMIN — SODIUM CHLORIDE: 9 INJECTION, SOLUTION INTRAVENOUS at 23:00

## 2020-07-12 RX ADMIN — SODIUM CHLORIDE: 9 INJECTION, SOLUTION INTRAVENOUS at 18:40

## 2020-07-12 RX ADMIN — ACETAMINOPHEN 650 MG: 325 TABLET ORAL at 20:22

## 2020-07-12 RX ADMIN — FOLIC ACID: 5 INJECTION, SOLUTION INTRAMUSCULAR; INTRAVENOUS; SUBCUTANEOUS at 11:47

## 2020-07-12 RX ADMIN — HYDROMORPHONE HYDROCHLORIDE 1 MG: 1 INJECTION, SOLUTION INTRAMUSCULAR; INTRAVENOUS; SUBCUTANEOUS at 19:32

## 2020-07-12 RX ADMIN — LORAZEPAM 3 MG: 2 INJECTION INTRAMUSCULAR; INTRAVENOUS at 07:35

## 2020-07-12 RX ADMIN — FENTANYL CITRATE 50 MCG: 50 INJECTION INTRAMUSCULAR; INTRAVENOUS at 15:00

## 2020-07-12 RX ADMIN — LORAZEPAM 1 MG: 2 INJECTION INTRAMUSCULAR; INTRAVENOUS at 03:35

## 2020-07-12 RX ADMIN — HYDROMORPHONE HYDROCHLORIDE 1 MG: 1 INJECTION, SOLUTION INTRAMUSCULAR; INTRAVENOUS; SUBCUTANEOUS at 08:42

## 2020-07-12 RX ADMIN — HYDROMORPHONE HYDROCHLORIDE 1 MG: 1 INJECTION, SOLUTION INTRAMUSCULAR; INTRAVENOUS; SUBCUTANEOUS at 04:25

## 2020-07-12 RX ADMIN — ALTEPLASE 1 MG/HR: 2.2 INJECTION, POWDER, LYOPHILIZED, FOR SOLUTION INTRAVENOUS at 06:37

## 2020-07-12 RX ADMIN — FENTANYL CITRATE 50 MCG: 50 INJECTION INTRAMUSCULAR; INTRAVENOUS at 01:12

## 2020-07-12 RX ADMIN — ALTEPLASE 1 MG/HR: 2.2 INJECTION, POWDER, LYOPHILIZED, FOR SOLUTION INTRAVENOUS at 16:15

## 2020-07-12 ASSESSMENT — PAIN SCALES - GENERAL
PAINLEVEL_OUTOF10: 9
PAINLEVEL_OUTOF10: 10
PAINLEVEL_OUTOF10: 0
PAINLEVEL_OUTOF10: 10
PAINLEVEL_OUTOF10: 10
PAINLEVEL_OUTOF10: 7
PAINLEVEL_OUTOF10: 0
PAINLEVEL_OUTOF10: 10
PAINLEVEL_OUTOF10: 10

## 2020-07-12 ASSESSMENT — PAIN DESCRIPTION - PROGRESSION
CLINICAL_PROGRESSION: NOT CHANGED

## 2020-07-12 ASSESSMENT — PAIN DESCRIPTION - ONSET
ONSET: ON-GOING

## 2020-07-12 ASSESSMENT — PAIN DESCRIPTION - ORIENTATION
ORIENTATION: LEFT

## 2020-07-12 ASSESSMENT — PAIN DESCRIPTION - FREQUENCY
FREQUENCY: CONTINUOUS

## 2020-07-12 ASSESSMENT — PAIN - FUNCTIONAL ASSESSMENT
PAIN_FUNCTIONAL_ASSESSMENT: PREVENTS OR INTERFERES SOME ACTIVE ACTIVITIES AND ADLS

## 2020-07-12 ASSESSMENT — PAIN DESCRIPTION - PAIN TYPE
TYPE: ACUTE PAIN

## 2020-07-12 ASSESSMENT — PAIN DESCRIPTION - DESCRIPTORS
DESCRIPTORS: ACHING
DESCRIPTORS: ACHING;SORE
DESCRIPTORS: ACHING;SORE
DESCRIPTORS: ACHING

## 2020-07-12 ASSESSMENT — PAIN DESCRIPTION - DIRECTION
RADIATING_TOWARDS: L FOOT
RADIATING_TOWARDS: LEFT FOOT
RADIATING_TOWARDS: L FOOT
RADIATING_TOWARDS: LEFT FOOT

## 2020-07-12 ASSESSMENT — PAIN DESCRIPTION - LOCATION
LOCATION: LEG

## 2020-07-12 NOTE — PROGRESS NOTES
Pt continues to bend leg despite constant reminders from myself and the sitter in the patient's room. Unable to draw labs from patient's heparin line on the EKOS catheter. Reached out to cardiology with pt's most recent lab results. Told to contact Dr. Alex Christianson to determine best course of action.

## 2020-07-12 NOTE — PROGRESS NOTES
07/12/20  0800   WBC 9.2 7.5 6.9   HGB 12.0* 10.5* 10.1*   HCT 36.2* 31.1* 30.5*   MCV 95.3 94.9 95.7   * 84* 79*     BMP:   Recent Labs     07/10/20  1246 07/11/20  1300 07/12/20  1000    135* 133*   K 4.0 4.0 3.8   CL 93* 97* 96*   CO2 28 26 26   BUN 6* 5* 6*   CREATININE <0.5* 0.5* <0.5*     Mag: No results for input(s): MAG in the last 72 hours. Phos: No results found for: PHOS  No components found for: GLU    LIVER PROFILE:   Recent Labs     07/09/20  1750 07/10/20  1246 07/11/20  1300 07/12/20  1000   * 93* 57* 38*   * 95* 60* 46*   BILIDIR <0.2  --   --   --    BILITOT 0.3 0.6 0.3 0.3   ALKPHOS 102 99 87 77     PT/INR:   Recent Labs     07/12/20  0035 07/12/20  0800 07/12/20  1155   PROTIME 12.7 12.5 12.8   INR 1.09 1.08 1.10     APTT:   Recent Labs     07/11/20  1815 07/12/20  0035 07/12/20  0800   APTT 53.2* 34.4 36.7*     UA:  Recent Labs     07/09/20  2112   COLORU DK YELLOW   PHUR 5.5  5.5   WBCUA 1   RBCUA 1   CLARITYU CLOUDY*   SPECGRAV 1.019   LEUKOCYTESUR Negative   UROBILINOGEN 0.2   BILIRUBINUR Negative   BLOODU Negative   GLUCOSEU Negative       Invalid input(s): ABG  Lab Results   Component Value Date    CALCIUM 7.8 (L) 07/12/2020       Assessment and Plan:    Acute left DVT Involving left external iliac vein, common femoral, profunda, femoral, posterior tibial, perennial, and anterior tibial veins  Status post balloon angioplasty of the left femoral vein. Continue with heparin , and TPA. EKOS in place   Plan to return to Cath Lab on Monday for further intervention  Bedrest  IV Dilaudid for pain     Alcohol abuse  Start to  withdrawal with beers   Discontinue the beers   Start CIWA protocol.   We might need to use precedex if he gets very agitate to prevent removing the EKOS  Continue  folic acid, thiamine, and multivitamin     Mild acute transaminitis  Most likely secondary to alcohol  Improved      Foreign bodies in left foot:  Seen by Ortho  No plan for any intervention     Code status:  Full code  DVT prophylaxis:  IV heparin  Disposition:   Pending clinical improvement           Electronically signed by Nani Juarez MD on 7/12/2020 at 12:36 PM

## 2020-07-12 NOTE — PLAN OF CARE
Problem: Pain:  Goal: Control of acute pain  Description: Control of acute pain  Outcome: Ongoing  Note: Pain level assessment complete using  at 0-10 pain scale. Pt educated on pain scale and control interventions. PRN pain medication given per pt request. Umang Calderon understanding to call out with new onset of pain or unrelieved pain. Problem: Falls - Risk of:  Goal: Will remain free from falls  Description: Will remain free from falls  Outcome: Met This Shift  Note: Bed locked in lowest position, side rails raised, bed alarm active,  at the bedside. Problem: Bleeding:  Goal: Will show no signs and symptoms of excessive bleeding  Description: Will show no signs and symptoms of excessive bleeding  Outcome: Ongoing  Note: Visually assessed skin for hematomas, gross blood, swelling, and ecchymosis. Hemorrhage precautions in place. Vitals stable. Problem: Discharge Planning:  Goal: Discharged to appropriate level of care  Description: Discharged to appropriate level of care  Outcome: Ongoing  Note: Social work consulted     Problem: Fluid Volume - Deficit:  Goal: Absence of fluid volume deficit signs and symptoms  Description: Absence of fluid volume deficit signs and symptoms  Outcome: Ongoing  Note: Continuing to monitor labs and vital signs. No present signs of fluid overload/depletion. Continuing to monitor I&O's per protocol. Patient denies nausea/vomiting/diarrhea. IV/INT assessments continue. Monitoring for signs/symptoms of unusual bleeding. Problem: Nutrition Deficit:  Goal: Ability to achieve adequate nutritional intake will improve  Description: Ability to achieve adequate nutritional intake will improve  Outcome: Ongoing  Note: Food and drink offered frequently, offering pleasure foods.       Problem: Sleep Pattern Disturbance:  Goal: Appears well-rested  Description: Appears well-rested  Outcome: Ongoing  Note: Natural sleep cycle encouraged (dim lights, quiet atmosphere, etc...). Non pharmacological sleep promotion in place, if applicable for patient.

## 2020-07-12 NOTE — PROGRESS NOTES
Cardiology - PROGRESS NOTE    Admit Date: 7/9/2020     Reason for follow up: DVT    72 y.o. patient with a PMH significant for peripheral artery disease, carotid artery disease, hyperlipidemia presented with complaints of left lower extremity swelling. Social History:   reports that he has been smoking cigarettes. He has been smoking about 1.50 packs per day. He has never used smokeless tobacco. He reports current alcohol use of about 56.0 standard drinks of alcohol per week. He reports current drug use. Drugs: Marijuana, IV, and Opiates . Family History: family history is not on file. Interval History:   Patient seen and examined and notes reviewed   Restless overnight and bending leg    sitter present   Patient currently sedated   plt 84   H/H 10.5/31.1     All other systems reviewed and negative except as above. Diet: DIET GENERAL;  Pain is:Mild  Nausea:None    In: -   Out: 300    Wt Readings from Last 3 Encounters:   07/10/20 211 lb 10.3 oz (96 kg)   07/17/16 151 lb 14.4 oz (68.9 kg)           Data:   Scheduled Meds:   Scheduled Meds:   sodium chloride flush  10 mL Intravenous 2 times per day    thiamine  100 mg Oral Daily    multivitamin  1 tablet Oral Daily    folic acid  1 mg Oral Daily     Continuous Infusions:   alteplase (ACTIVASE) in 0.9% sodium chloride infusion (EKOS catheter) 1 mg/hr (07/12/20 0640)    heparin (porcine) 250 Units/hr (07/10/20 1834)    sodium chloride 35 mL/hr at 07/11/20 1645     PRN Meds:. LORazepam **OR** LORazepam **OR** LORazepam **OR** LORazepam **OR** LORazepam **OR** LORazepam **OR** LORazepam **OR** LORazepam, polyethylene glycol, HYDROmorphone, oxyCODONE, fentanNYL, sodium chloride flush, acetaminophen **OR** acetaminophen, ondansetron **OR** ondansetron, diphenhydrAMINE  Continuous Infusions:   alteplase (ACTIVASE) in 0.9% sodium chloride infusion (EKOS catheter) 1 mg/hr (07/12/20 5373)  heparin (porcine) 250 Units/hr (07/10/20 1834)    sodium chloride 35 mL/hr at 07/11/20 1645       Intake/Output Summary (Last 24 hours) at 7/12/2020 0800  Last data filed at 7/12/2020 0235  Gross per 24 hour   Intake 1542.93 ml   Output 675 ml   Net 867.93 ml       CBC:   Recent Labs     07/12/20  0035   WBC 7.5   HGB 10.5*   PLT 84*     BMP:  Recent Labs     07/11/20  1300   *   K 4.0   CL 97*   CO2 26   BUN 5*   CREATININE 0.5*   GLUCOSE 155*     ABGs: No results found for: PHART, PO2ART, OKP7HXO  INR:   Recent Labs     07/11/20  1336 07/11/20  1815 07/12/20  0035   INR 0.98 1.02 1.09        CARDIAC LABS     ENZYMES:No results for input(s): CKMB, CKMBINDEX, TROPONINI in the last 72 hours. Invalid input(s): CKTOTAL;3  FASTING LIPID PANEL:No results found for: HDL, LDLDIRECT, LDLCALC, TRIG  LIVER PROFILE:  Recent Labs     07/09/20  1750 07/10/20  1246 07/11/20  1300   * 93* 57*   * 95* 60*       -----------------------------------------------------------------  Telemetry: personally reviewed     RAD:    VL LOWER      Summary          - Acute versus subacute totally occluding deep vein thrombosis involving the     left external iliac vein, common femoral, profunda, femoral, posterior     tibials, perennials and anterior tibial veins.             Echo: None     PROCEDURES PERFORMED:  1. Left lower extremity venography, iliac and femoral vein venography,  left lower extremity. 2.  Balloon angioplasty of left femoral vein. 4.  Insertion of EKOS tPA catheter from iliac vein into the femoral  vein. Objective:   Vitals: BP (!) 134/95   Pulse 103   Temp 98.5 °F (36.9 °C) (Oral)   Resp 21   Ht 6' 3\" (1.905 m)   Wt 211 lb 10.3 oz (96 kg)   SpO2 98%   BMI 26.45 kg/m²   General appearance: sedated, chronically ill appearing,  Skin: Skin color, texture, turgor normal. No rashes or ecchymosis. HEENT: Head: Normocephalic, no lesions, without obvious abnormality.   Neck: no adenopathy, no carotid bruit, no JVD, supple, symmetrical, trachea midline and thyroid not enlarged, symmetric, no tenderness/mass/nodules  Lungs: diminished breath sounds bilaterally, no accessory muscle use, no respiratory distress, on RA  Heart: regularly irregular rhythm and S1, S2 normal  Abdomen: soft, non-tender; bowel sounds normal; no masses,  no organomegaly  Extremities: LLE pulses DP/PT doppler, increased in edema (+3) , pulses:         Assessment & Plan:    Patient Active Problem List:     Localized swelling of left lower leg     Acute deep vein thrombosis (DVT) of iliac vein of left lower extremity ([address]        Plan:  1. DVT   Left iliac    EKOS in place   Left leg with doppler pulses and increase in swelling     Dr Zahida Way notified    ACE wrap leg, elevated     Continue infusion   2. Thrombocytopenia    plt 84 this AM     No changes per Dr Zahida Way   3.  ETOH abuse/withdrawal    Currently sedated    Defer to primary team            FUNMI Albert-CNP   [address]  Cardiology   7/12/2020  8:00 AM

## 2020-07-12 NOTE — PROGRESS NOTES
0720: Handoff with Almita Del Real 37: Pt sitting up and bed and bending L knee. Unable to follow directions. CIWA 18- 3mg of Ativan given IVP per CIWA scale. Knee immobilizer placed. Sitter at bedside. 0800: Venous sheath is unable to return enough blood for lab specimens. Was able to place a tourniquet and draw off of PIV after wasting 5ml. LLE calf measured 38cm and marked. [id number] Bettencourt, Cardiology NP rounding. Discussed venous sheath not having blood return, but does flush and infuse. LLE edema has worsened- order to wrap with ace wrap from toes to mid-thigh. Dr. Nikki Valentine is aware of platelet count of 79.    1000: Dr. Cathy Potter rounding- aware of CIWA initiation. Beer order pham'wilbert Roblero for phlebotomist to stick pt for CMP per MD. Adjustments made to pain medications. 1045: Ace wrap applied to LLE    1200: Reassessment complete. CIWA 6- no intervention required. Does c/o pain, but dozes off. LLE in ace wrap. Doppler pedal pulses. EKOS continues to L popliteal without issue. 1436: CIWA 15. 2 mg of Ativan given IVP. Disoriented to time- believes it is [place], and disoriented to location- named a street address. Was surprised to be informed he is in the hospital and asked if he just arrived today. Agreed to eat lunch and is taking sips of water. Sitter remains at the bedside for safety. 1500: Awake and anxious, not following commands. Continually lifting LLE and removing O2 and CVU monitors. C/O 9/10 pain in LLE- PRN Fentanyl given, see MAR.     1515: Precedex has been started. Pt is very agitated and will not leave monitors in place. Pulling on EKOS catheters and IV. RN and sitter trying to re-direct. 1600: Reassessed. RASS-1. Attempted to use urinal, but only voided 25ml. Has taken very little PO fluids today and banana bag wasn't started until 1147.     1630: Perfect serve message sent to Dr. Cathy Potter asking for urinary retention guidelines for straight cath or mcnair insertion orders.  Order to straight cath for retention of >400ml. 1800: Spoke to Dr. Daisha Mckeon for anesthesia consult. Patient is not yet on cath lab schedule. Anesthesia will see pt tomorrow. 1815: Bladder scan showed 405ml. Straight cathed 750ml of clear lynda urine. Dr. Eun Boyd notified. Order to place indwelling catheter if pt retains >400ml again.

## 2020-07-12 NOTE — PROGRESS NOTES
Pt fidgeting and continually moving. Pt told that he needs to keep his leg straight and still or risk injuring his it and displacing EKOS catheter. Pt states that he understands. Pt continues to ask for another beer and more pain medication.

## 2020-07-12 NOTE — CONSULTS
Patient is seen at the request of Dr. Kandy Halsted for alcohol withdrawal    PCP: No primary care provider on file. Please note that history is obtained from chart. Patient is unable to provide a history due to altered mental status. HISTORY OF PRESENT ILLNESS: 72y.o. year old male who was admitted on 7/9/20 with left lower extremity swelling. He was found to have an occlusive DVT extending from the iliac vein to the anterior tibial veins. He underwent catheter directed thrombolysis on 7/9/2020. Overnight he became increasingly agitated. He has a history of alcohol abuse. He was given Dilaudid, fentanyl and started on the CIWA protocol.     PAST MEDICAL HISTORY:  Past Medical History:   Diagnosis Date    Hyperlipidemia     Seizures ([facility] [address]        MEDICATIONS:  Current Facility-Administered Medications: LORazepam (ATIVAN) tablet 1 mg, 1 mg, Oral, Q1H PRN **OR** LORazepam (ATIVAN) injection 1 mg, 1 mg, Intravenous, Q1H PRN **OR** LORazepam (ATIVAN) tablet 2 mg, 2 mg, Oral, Q1H PRN **OR** LORazepam (ATIVAN) injection 2 mg, 2 mg, Intravenous, Q1H PRN **OR** LORazepam (ATIVAN) tablet 3 mg, 3 mg, Oral, Q1H PRN **OR** LORazepam (ATIVAN) injection 3 mg, 3 mg, Intravenous, Q1H PRN **OR** LORazepam (ATIVAN) tablet 4 mg, 4 mg, Oral, Q1H PRN **OR** LORazepam (ATIVAN) injection 4 mg, 4 mg, Intravenous, Q1H PRN  polyethylene glycol (GLYCOLAX) packet 17 g, 17 g, Oral, Daily PRN  HYDROmorphone (DILAUDID) injection 1 mg, 1 mg, Intravenous, Q4H PRN  sodium chloride 0.9 % 9,296 mL with folic acid 1 mg, adult multi-vitamin with vitamin k 10 mL, thiamine 100 mg, , Intravenous, Daily  fentaNYL (SUBLIMAZE) injection 50 mcg, 50 mcg, Intravenous, Q4H PRN  sodium chloride flush 0.9 % injection 10 mL, 10 mL, Intravenous, 2 times per day  sodium chloride flush 0.9 % injection 10 mL, 10 mL, Intravenous, PRN  acetaminophen (TYLENOL) tablet 650 mg, 650 mg, Oral, Q6H PRN **OR** acetaminophen (TYLENOL) suppository 650 mg, 650 mg, Rectal, Q6H PRN  ondansetron (ZOFRAN-ODT) disintegrating tablet 4 mg, 4 mg, Oral, Q8H PRN **OR** ondansetron (ZOFRAN) injection 4 mg, 4 mg, Intravenous, Q6H PRN  alteplase (CATHFLO) 12 mg in sodium chloride 0.9 % 250 mL infusion, 1 mg/hr, Intracatheter, Continuous  heparin 25,000 units in dextrose 5 % 250 mL infusion (rate based), 250 Units/hr, Intravenous, Continuous  0.9 % sodium chloride infusion, , Intravenous, Continuous  diphenhydrAMINE (BENADRYL) tablet 25 mg, 25 mg, Oral, Nightly PRN      ALLERGIES:  Patient has No Known Allergies. FAMILY HISTORY:  Unable to obtain due to altered mental status    SOCIAL HISTORY:  Social History     Socioeconomic History    Marital status: Single     Spouse name: Not on file    Number of children: Not on file    Years of education: Not on file    Highest education level: Not on file   Occupational History    Not on file   Social Needs    Financial resource strain: Not on file    Food insecurity     Worry: Not on file     Inability: Not on file    Transportation needs     Medical: Not on file     Non-medical: Not on file   Tobacco Use    Smoking status: Current Every Day Smoker     Packs/day: 1.50     Types: Cigarettes    Smokeless tobacco: Never Used   Substance and Sexual Activity    Alcohol use:  Yes     Alcohol/week: 56.0 standard drinks     Types: 56 Cans of beer per week    Drug use: Yes     Types: Marijuana, IV, Opiates      Comment: Heroin - snorts    Sexual activity: Not on file   Lifestyle    Physical activity     Days per week: Not on file     Minutes per session: Not on file    Stress: Not on file   Relationships    Social connections     Talks on phone: Not on file     Gets together: Not on file     Attends Confucianist service: Not on file     Active member of club or organization: Not on file     Attends meetings of clubs or organizations: Not on file     Relationship status: Not on file    Intimate partner violence     Fear of current or ex partner: Not on file     Emotionally abused: Not on file     Physically abused: Not on file     Forced sexual activity: Not on file   Other Topics Concern    Not on file   Social History Narrative    Not on file      reports that he has been smoking cigarettes. He has been smoking about 1.50 packs per day. He has never used smokeless tobacco.    REVIEW OF SYSTEMS:  Unable to obtain due to altered mental status    Objective:   PHYSICAL EXAM:  Blood pressure 133/86, pulse 100, temperature 98.4 °F (36.9 °C), temperature source Axillary, resp. rate 9, height 6' 3\" (1.905 m), weight 211 lb 10.3 oz (96 kg), SpO2 98 %. on 3L NC    Gen: Well developed; well nourished  Eyes: No scleral icterus. No conjunctival injection. ENT:  Oropharynx clear. External appearance of ears and nose normal.  Neck: Trachea midline. No obvious mass. No visible thyroid enlargement    Respiratory: Clear to auscultation bilaterally on anterior exam, no accessory muscle use  Cardiovascular: Regular rate and rhythm, no appreciable murmurs. No edema. Gastrointestinal: Soft, non-tender. No hernia  Skin: Warm and dry. No rashes or ulcers on visible areas. Normal texture and turgor  Lymphatic: No cervical LAD. No supraclavicular LAD. Musculoskeletal: No cyanosis, clubbing or joint deformity.     Psychiatric: Sleeping; does not respond to voice; unable to adequately assess insight and judgement       Data Reviewed:   LABS:  CBC:   Recent Labs     07/11/20  1815 07/12/20  0035 07/12/20  0800   WBC 9.2 7.5 6.9   HGB 12.0* 10.5* 10.1*   HCT 36.2* 31.1* 30.5*   MCV 95.3 94.9 95.7   * 84* 79*     BMP:   Recent Labs     07/10/20  1246 07/11/20  1300 07/12/20  1000    135* 133*   K 4.0 4.0 3.8   CL 93* 97* 96*   CO2 28 26 26   BUN 6* 5* 6*   CREATININE <0.5* 0.5* <0.5*     LIVER PROFILE:   Recent Labs     07/09/20  1750 07/10/20  1246 07/11/20  1300 07/12/20  1000   * 93* 57* 38*   * 95* 60* 46*   BILIDIR <0.2  --   --   --    BILITOT 0.3 0.6 0.3 0.3   ALKPHOS 102 99 87 77     PT/INR:   Recent Labs     07/12/20  0035 07/12/20  0800 07/12/20  1155   PROTIME 12.7 12.5 12.8   INR 1.09 1.08 1.10     APTT:   Recent Labs     07/11/20  1815 07/12/20  0035 07/12/20  0800   APTT 53.2* 34.4 36.7*     Images and reports from chest imaging were reviewed by me. My interpretation is:  CXR (78/20): Clear lung fields      Assessment:     Acute metabolic encephalopathy  Alcohol withdrawal  Left lower extremity DVT    Plan:      Acute metabolic encephalopathy  -Appears to be due to alcohol withdrawal  -CIWA protocol    Alcohol withdrawal  -CIWA protocol  -Banana bag daily    Left lower extremity DVT  -Catheter directed thrombolysis per cardiology      Prophylaxis  DVT- on heparin drip    Patient is at high risk given the presence of acute encephalopathy and occlusive DVT requiring catheter directed thrombolysis.     Israel Mcmullen MD  [facility] Pulmonology, Critical Care and Sleep

## 2020-07-13 ENCOUNTER — APPOINTMENT (OUTPATIENT)
Dept: CARDIAC CATH/INVASIVE PROCEDURES | Age: 65
DRG: 253 | End: 2020-07-13
Payer: MEDICARE

## 2020-07-13 ENCOUNTER — APPOINTMENT (OUTPATIENT)
Dept: GENERAL RADIOLOGY | Age: 65
DRG: 253 | End: 2020-07-13
Payer: MEDICARE

## 2020-07-13 LAB
A/G RATIO: 1.3 (ref 1.1–2.2)
ALBUMIN SERPL-MCNC: 2.6 G/DL (ref 3.4–5)
ALP BLD-CCNC: 102 U/L (ref 40–129)
ALT SERPL-CCNC: 41 U/L (ref 10–40)
AMMONIA: 22 UMOL/L (ref 16–60)
ANION GAP SERPL CALCULATED.3IONS-SCNC: 12 MMOL/L (ref 3–16)
APTT: 30 SEC (ref 24.2–36.2)
APTT: 30.4 SEC (ref 24.2–36.2)
AST SERPL-CCNC: 74 U/L (ref 15–37)
BILIRUB SERPL-MCNC: 0.4 MG/DL (ref 0–1)
BILIRUBIN DIRECT: 0.3 MG/DL (ref 0–0.3)
BILIRUBIN, INDIRECT: 0.1 MG/DL (ref 0–1)
BLOOD CULTURE, ROUTINE: NORMAL
BUN BLDV-MCNC: 6 MG/DL (ref 7–20)
CALCIUM SERPL-MCNC: 7.2 MG/DL (ref 8.3–10.6)
CHLORIDE BLD-SCNC: 100 MMOL/L (ref 99–110)
CO2: 24 MMOL/L (ref 21–32)
CREAT SERPL-MCNC: <0.5 MG/DL (ref 0.8–1.3)
CULTURE, BLOOD 2: NORMAL
FIBRINOGEN: 119 MG/DL (ref 200–397)
FIBRINOGEN: 119 MG/DL (ref 200–397)
GFR AFRICAN AMERICAN: >60
GFR NON-AFRICAN AMERICAN: >60
GLOBULIN: 2 G/DL
GLUCOSE BLD-MCNC: 94 MG/DL (ref 70–99)
HCT VFR BLD CALC: 23 % (ref 40.5–52.5)
HEMOGLOBIN: 7.4 G/DL (ref 13.5–17.5)
INR BLD: 1.03 (ref 0.86–1.14)
MCH RBC QN AUTO: 31.2 PG (ref 26–34)
MCHC RBC AUTO-ENTMCNC: 32.4 G/DL (ref 31–36)
MCV RBC AUTO: 96.4 FL (ref 80–100)
PDW BLD-RTO: 13.4 % (ref 12.4–15.4)
PLATELET # BLD: 70 K/UL (ref 135–450)
PMV BLD AUTO: 8.7 FL (ref 5–10.5)
POC ACT LR: 138 SEC
POTASSIUM SERPL-SCNC: 3.7 MMOL/L (ref 3.5–5.1)
PROTHROMBIN TIME: 11.9 SEC (ref 10–13.2)
RBC # BLD: 2.38 M/UL (ref 4.2–5.9)
SODIUM BLD-SCNC: 136 MMOL/L (ref 136–145)
TOTAL PROTEIN: 4.6 G/DL (ref 6.4–8.2)
WBC # BLD: 4.4 K/UL (ref 4–11)

## 2020-07-13 PROCEDURE — 37214 CESSJ THERAPY CATH REMOVAL: CPT | Performed by: INTERNAL MEDICINE

## 2020-07-13 PROCEDURE — 76499 UNLISTED DX RADIOGRAPHIC PX: CPT

## 2020-07-13 PROCEDURE — 2580000003 HC RX 258: Performed by: HOSPITALIST

## 2020-07-13 PROCEDURE — 82140 ASSAY OF AMMONIA: CPT

## 2020-07-13 PROCEDURE — 6360000002 HC RX W HCPCS

## 2020-07-13 PROCEDURE — 2500000003 HC RX 250 WO HCPCS

## 2020-07-13 PROCEDURE — C1769 GUIDE WIRE: HCPCS

## 2020-07-13 PROCEDURE — 2700000000 HC OXYGEN THERAPY PER DAY

## 2020-07-13 PROCEDURE — 37252 INTRVASC US NONCORONARY 1ST: CPT

## 2020-07-13 PROCEDURE — 82248 BILIRUBIN DIRECT: CPT

## 2020-07-13 PROCEDURE — 80053 COMPREHEN METABOLIC PANEL: CPT

## 2020-07-13 PROCEDURE — 6370000000 HC RX 637 (ALT 250 FOR IP): Performed by: HOSPITALIST

## 2020-07-13 PROCEDURE — 2709999900 HC NON-CHARGEABLE SUPPLY

## 2020-07-13 PROCEDURE — 2580000003 HC RX 258: Performed by: INTERNAL MEDICINE

## 2020-07-13 PROCEDURE — 6360000002 HC RX W HCPCS: Performed by: HOSPITALIST

## 2020-07-13 PROCEDURE — 37252 INTRVASC US NONCORONARY 1ST: CPT | Performed by: INTERNAL MEDICINE

## 2020-07-13 PROCEDURE — 75825 VEIN X-RAY TRUNK: CPT

## 2020-07-13 PROCEDURE — 99152 MOD SED SAME PHYS/QHP 5/>YRS: CPT

## 2020-07-13 PROCEDURE — C1753 CATH, INTRAVAS ULTRASOUND: HCPCS

## 2020-07-13 PROCEDURE — 94640 AIRWAY INHALATION TREATMENT: CPT

## 2020-07-13 PROCEDURE — 94761 N-INVAS EAR/PLS OXIMETRY MLT: CPT

## 2020-07-13 PROCEDURE — 37253 INTRVASC US NONCORONARY ADDL: CPT

## 2020-07-13 PROCEDURE — 71045 X-RAY EXAM CHEST 1 VIEW: CPT

## 2020-07-13 PROCEDURE — 85027 COMPLETE CBC AUTOMATED: CPT

## 2020-07-13 PROCEDURE — 75820 VEIN X-RAY ARM/LEG: CPT

## 2020-07-13 PROCEDURE — 6370000000 HC RX 637 (ALT 250 FOR IP): Performed by: NURSE PRACTITIONER

## 2020-07-13 PROCEDURE — 85347 COAGULATION TIME ACTIVATED: CPT

## 2020-07-13 PROCEDURE — 2500000003 HC RX 250 WO HCPCS: Performed by: HOSPITALIST

## 2020-07-13 PROCEDURE — 85730 THROMBOPLASTIN TIME PARTIAL: CPT

## 2020-07-13 PROCEDURE — 6360000002 HC RX W HCPCS: Performed by: PEDIATRICS

## 2020-07-13 PROCEDURE — 99232 SBSQ HOSP IP/OBS MODERATE 35: CPT | Performed by: INTERNAL MEDICINE

## 2020-07-13 PROCEDURE — 6370000000 HC RX 637 (ALT 250 FOR IP): Performed by: INTERNAL MEDICINE

## 2020-07-13 PROCEDURE — 6360000002 HC RX W HCPCS: Performed by: INTERNAL MEDICINE

## 2020-07-13 PROCEDURE — 99153 MOD SED SAME PHYS/QHP EA: CPT

## 2020-07-13 PROCEDURE — 2100000000 HC CCU R&B

## 2020-07-13 PROCEDURE — 6360000004 HC RX CONTRAST MEDICATION: Performed by: HOSPITALIST

## 2020-07-13 PROCEDURE — 85384 FIBRINOGEN ACTIVITY: CPT

## 2020-07-13 PROCEDURE — 37214 CESSJ THERAPY CATH REMOVAL: CPT

## 2020-07-13 PROCEDURE — 51798 US URINE CAPACITY MEASURE: CPT

## 2020-07-13 RX ORDER — SODIUM CHLORIDE 0.9 % (FLUSH) 0.9 %
10 SYRINGE (ML) INJECTION EVERY 12 HOURS SCHEDULED
Status: DISCONTINUED | OUTPATIENT
Start: 2020-07-13 | End: 2020-07-16 | Stop reason: HOSPADM

## 2020-07-13 RX ORDER — IPRATROPIUM BROMIDE AND ALBUTEROL SULFATE 2.5; .5 MG/3ML; MG/3ML
1 SOLUTION RESPIRATORY (INHALATION)
Status: DISCONTINUED | OUTPATIENT
Start: 2020-07-13 | End: 2020-07-16 | Stop reason: HOSPADM

## 2020-07-13 RX ORDER — ACETAMINOPHEN 325 MG/1
650 TABLET ORAL EVERY 4 HOURS PRN
Status: DISCONTINUED | OUTPATIENT
Start: 2020-07-13 | End: 2020-07-16 | Stop reason: HOSPADM

## 2020-07-13 RX ORDER — FOLIC ACID 1 MG/1
1 TABLET ORAL DAILY
Status: DISCONTINUED | OUTPATIENT
Start: 2020-07-13 | End: 2020-07-16 | Stop reason: HOSPADM

## 2020-07-13 RX ORDER — SODIUM CHLORIDE 0.9 % (FLUSH) 0.9 %
10 SYRINGE (ML) INJECTION PRN
Status: DISCONTINUED | OUTPATIENT
Start: 2020-07-13 | End: 2020-07-16 | Stop reason: HOSPADM

## 2020-07-13 RX ORDER — THIAMINE MONONITRATE (VIT B1) 100 MG
100 TABLET ORAL DAILY
Status: DISCONTINUED | OUTPATIENT
Start: 2020-07-13 | End: 2020-07-16 | Stop reason: HOSPADM

## 2020-07-13 RX ORDER — MULTIVITAMIN WITH IRON
1 TABLET ORAL DAILY
Status: DISCONTINUED | OUTPATIENT
Start: 2020-07-13 | End: 2020-07-16 | Stop reason: HOSPADM

## 2020-07-13 RX ADMIN — FENTANYL CITRATE 50 MCG: 50 INJECTION INTRAMUSCULAR; INTRAVENOUS at 13:18

## 2020-07-13 RX ADMIN — ALTEPLASE 1 MG/HR: 2.2 INJECTION, POWDER, LYOPHILIZED, FOR SOLUTION INTRAVENOUS at 08:24

## 2020-07-13 RX ADMIN — APIXABAN 10 MG: 5 TABLET, FILM COATED ORAL at 13:16

## 2020-07-13 RX ADMIN — FOLIC ACID 1 MG: 1 TABLET ORAL at 14:58

## 2020-07-13 RX ADMIN — IOPAMIDOL 31 ML: 755 INJECTION, SOLUTION INTRAVENOUS at 10:43

## 2020-07-13 RX ADMIN — FOLIC ACID: 5 INJECTION, SOLUTION INTRAMUSCULAR; INTRAVENOUS; SUBCUTANEOUS at 09:05

## 2020-07-13 RX ADMIN — Medication 100 MG: at 14:58

## 2020-07-13 RX ADMIN — LORAZEPAM 1 MG: 2 INJECTION INTRAMUSCULAR; INTRAVENOUS at 03:13

## 2020-07-13 RX ADMIN — KETOROLAC TROMETHAMINE 15 MG: 15 INJECTION, SOLUTION INTRAMUSCULAR; INTRAVENOUS at 14:58

## 2020-07-13 RX ADMIN — FENTANYL CITRATE 50 MCG: 50 INJECTION INTRAMUSCULAR; INTRAVENOUS at 20:10

## 2020-07-13 RX ADMIN — APIXABAN 10 MG: 5 TABLET, FILM COATED ORAL at 20:11

## 2020-07-13 RX ADMIN — HYDROMORPHONE HYDROCHLORIDE 1 MG: 1 INJECTION, SOLUTION INTRAMUSCULAR; INTRAVENOUS; SUBCUTANEOUS at 22:53

## 2020-07-13 RX ADMIN — LORAZEPAM 1 MG: 2 INJECTION INTRAMUSCULAR; INTRAVENOUS at 04:15

## 2020-07-13 RX ADMIN — IPRATROPIUM BROMIDE AND ALBUTEROL SULFATE 1 AMPULE: .5; 3 SOLUTION RESPIRATORY (INHALATION) at 20:39

## 2020-07-13 RX ADMIN — FENTANYL CITRATE 50 MCG: 50 INJECTION INTRAMUSCULAR; INTRAVENOUS at 03:10

## 2020-07-13 RX ADMIN — HYDROMORPHONE HYDROCHLORIDE 1 MG: 1 INJECTION, SOLUTION INTRAMUSCULAR; INTRAVENOUS; SUBCUTANEOUS at 16:55

## 2020-07-13 RX ADMIN — HYDROMORPHONE HYDROCHLORIDE 1 MG: 1 INJECTION, SOLUTION INTRAMUSCULAR; INTRAVENOUS; SUBCUTANEOUS at 12:35

## 2020-07-13 RX ADMIN — HYDROMORPHONE HYDROCHLORIDE 1 MG: 1 INJECTION, SOLUTION INTRAMUSCULAR; INTRAVENOUS; SUBCUTANEOUS at 04:10

## 2020-07-13 RX ADMIN — DEXMEDETOMIDINE HYDROCHLORIDE 0.25 MCG/KG/HR: 4 INJECTION, SOLUTION INTRAVENOUS at 01:10

## 2020-07-13 RX ADMIN — THERA TABS 1 TABLET: TAB at 14:58

## 2020-07-13 RX ADMIN — LORAZEPAM 1 MG: 2 INJECTION INTRAMUSCULAR; INTRAVENOUS at 11:53

## 2020-07-13 ASSESSMENT — PAIN DESCRIPTION - PROGRESSION

## 2020-07-13 ASSESSMENT — PAIN DESCRIPTION - PAIN TYPE
TYPE: ACUTE PAIN

## 2020-07-13 ASSESSMENT — PAIN SCALES - GENERAL
PAINLEVEL_OUTOF10: 8
PAINLEVEL_OUTOF10: 10
PAINLEVEL_OUTOF10: 8
PAINLEVEL_OUTOF10: 10
PAINLEVEL_OUTOF10: 0
PAINLEVEL_OUTOF10: 9
PAINLEVEL_OUTOF10: 4
PAINLEVEL_OUTOF10: 10
PAINLEVEL_OUTOF10: 0
PAINLEVEL_OUTOF10: 8
PAINLEVEL_OUTOF10: 8

## 2020-07-13 ASSESSMENT — PAIN - FUNCTIONAL ASSESSMENT
PAIN_FUNCTIONAL_ASSESSMENT: PREVENTS OR INTERFERES SOME ACTIVE ACTIVITIES AND ADLS

## 2020-07-13 ASSESSMENT — PAIN DESCRIPTION - DESCRIPTORS
DESCRIPTORS: ACHING

## 2020-07-13 ASSESSMENT — PAIN DESCRIPTION - FREQUENCY
FREQUENCY: CONTINUOUS

## 2020-07-13 ASSESSMENT — PAIN DESCRIPTION - LOCATION
LOCATION: LEG

## 2020-07-13 ASSESSMENT — PAIN DESCRIPTION - ORIENTATION
ORIENTATION: LEFT

## 2020-07-13 ASSESSMENT — PAIN DESCRIPTION - ONSET
ONSET: ON-GOING

## 2020-07-13 ASSESSMENT — PAIN DESCRIPTION - DIRECTION: RADIATING_TOWARDS: L FOOT

## 2020-07-13 NOTE — OP NOTE
[address]   Procedure Note    CLINICAL HISTORY:       Shannen Young is a 72 y.o. male with a history of alcohol abuse. He was admitted with complaints of leg swelling. Is s/p venogram and EKOS catheter placement. The risks, benefits, and details of the procedure were explained to the patient. The patient verbalized understanding and wanted to proceed. Informed written consent was obtained. CONTRAST:  Total of 40 cc. COMPLICATIONS:  None. EBL: 10 cc      VENOGRAM:       - Left iliac vein is patent with reduction in clot burden, IVC is widely patent   - Thrombus and collateral flow remains in the femoral/popliteal veins     INTERVENTION  1. EKOS catheter removed  2. Glide wire advantage positioned into the IVC  3. MP diagnostic catheter positioned into the IVC  4. Venogram performed   5.  IVUS catheter advanced into the iliac vein and confirmed collateral access     SUMMARY:   Successful EKOS explant   Significant improvement in iliac vein clot burden     RECOMMENDATION:    Eliquis   Repeat venous doppler in one month     MD Tata [address] and Interventional Cardiology   [facility]   (C): [phone]  (O): [phone]

## 2020-07-13 NOTE — PROGRESS NOTES
Hospitalist Progress Note      PCP: No primary care provider on file. Date of Admission: 7/9/2020    Chief Complaint: Leg Pain      Hospital Course: 69I alcoholic with 6 weeks L leg redness / swelling / edema, lactic acid 2.2, started on anticoagulation and CIWA protocol. Vascular consulted for phlegmasia with catheter directed intervention with thrombectomy. EKOS tPA catheter was placed 7/10, developed lethargy 7/12 secondary to withdrawal.    Subjective: continues to complain of L leg pain, denies chest pain, cough, fever,       Medications:  Reviewed    Infusion Medications    dexmedetomidine 0.15 mcg/kg/hr (07/13/20 0526)    sodium chloride 50 mL/hr at 07/12/20 2300    alteplase (ACTIVASE) in 0.9% sodium chloride infusion (EKOS catheter) 1 mg/hr (07/13/20 0824)    heparin (porcine) 250 Units/hr (07/10/20 1834)    sodium chloride 35 mL/hr at 07/12/20 1840     Scheduled Medications    folic acid, thiamine, multi-vitamin with vitamin K infusion   Intravenous Daily    sodium chloride flush  10 mL Intravenous 2 times per day     PRN Meds: LORazepam **OR** LORazepam **OR** LORazepam **OR** LORazepam **OR** LORazepam **OR** LORazepam **OR** LORazepam **OR** LORazepam, polyethylene glycol, HYDROmorphone, ketorolac, fentanNYL, sodium chloride flush, acetaminophen **OR** acetaminophen, ondansetron **OR** ondansetron, diphenhydrAMINE      Intake/Output Summary (Last 24 hours) at 7/13/2020 0902  Last data filed at 7/13/2020 0717  Gross per 24 hour   Intake 3880.2 ml   Output 1458 ml   Net 2422.2 ml       Physical Exam Performed:    BP (!) 90/47   Pulse 60   Temp 97.4 °F (36.3 °C) (Oral)   Resp 18   Ht 6' 3\" (1.905 m)   Wt 211 lb 10.3 oz (96 kg)   SpO2 98%   BMI 26.45 kg/m²     General appearance: No apparent distress, appears stated age and cooperative. HEENT: Pupils equal, round, and reactive to light. Conjunctivae/corneas clear. Neck: Supple, with full range of motion.  No jugular venous distention. Trachea midline. Respiratory:  Normal respiratory effort. Diminished breath sounds bilaterally Clear to auscultation, bilaterally without Rales/Wheezes/Rhonchi. Cardiovascular: Regular rate and rhythm with normal S1/S2 without murmurs, rubs or gallops. Abdomen: Soft, non-tender, non-distended with normal bowel sounds. Musculoskeletal: No clubbing, cyanosis (+) edema L leg  Skin: Skin color, texture, turgor normal.  No rashes or lesions. Neurologic:  Neurovascularly intact without any focal sensory/motor deficits. Cranial nerves: II-XII intact, grossly non-focal.  Psychiatric: Alert and oriented, thought content appropriate, normal insight  Capillary Refill: Brisk,< 3 seconds   Peripheral Pulses: +2 palpable, equal bilaterally       Labs:   Recent Labs     07/12/20  1749 07/12/20  2324 07/13/20  0619   WBC 5.0 4.7 4.4   HGB 7.8* 7.9* 7.4*   HCT 24.5* 24.0* 23.0*   PLT 68* 70* 70*     Recent Labs     07/11/20  1300 07/12/20  1000 07/13/20  0619   * 133* 136   K 4.0 3.8 3.7   CL 97* 96* 100   CO2 26 26 24   BUN 5* 6* 6*   CREATININE 0.5* <0.5* <0.5*   CALCIUM 8.2* 7.8* 7.2*     Recent Labs     07/11/20  1300 07/12/20  1000 07/13/20  0619   AST 57* 38* 74*   ALT 60* 46* 41*   BILIDIR  --   --  0.3   BILITOT 0.3 0.3 0.4   ALKPHOS 87 77 102     Recent Labs     07/12/20  1155 07/12/20  1749 07/12/20  2324   INR 1.10 1.06 1.03     No results for input(s): CKTOTAL, TROPONINI in the last 72 hours. Urinalysis:      Lab Results   Component Value Date    NITRU Negative 07/09/2020    WBCUA 1 07/09/2020    RBCUA 1 07/09/2020    BLOODU Negative 07/09/2020    SPECGRAV 1.019 07/09/2020    GLUCOSEU Negative 07/09/2020       Radiology:  VL Extremity Venous Left   Final Result      XR FOOT LEFT (MIN 3 VIEWS)   Final Result   Large amount of soft tissue swelling with no acute or focal bony abnormality   identified.       There are couple of punctate radiopaque foreign bodies with project over the   plantar and

## 2020-07-13 NOTE — PROGRESS NOTES
Pulmonary Progress Note    Date of Admission: 7/9/2020   LOS: 3 days     CC:  Chief Complaint   Patient presents with    Leg Pain     leg pain and swelling. onset 6 weeks ago. leg is swollen and red. denies fever       HPI/Subjective  No acute events o/n, drinking beer with breakfast.  No complaints    ROS:   No nausea  No Vomiting  No chest pain      Intake/Output Summary (Last 24 hours) at 7/13/2020 1437  Last data filed at 7/13/2020 1300  Gross per 24 hour   Intake 2293.2 ml   Output 1618 ml   Net 675.2 ml         PHYSICAL EXAM:   Blood pressure (!) 97/52, pulse 78, temperature 97.9 °F (36.6 °C), temperature source Oral, resp. rate 18, height 6' 3\" (1.905 m), weight 211 lb 10.3 oz (96 kg), SpO2 99 %.'  Gen:  No acute distress. Eyes: PERRL. Anicteric sclera. No conjunctival injection. ENT: No discharge. Posterior oropharynx clear. External appearance of ears and nose normal.  Neck: Trachea midline. No mass   Resp:  No crackles. No wheezes. No rhonchi. No dullness on percussion. CV: Regular rate. Regular rhythm. No murmur or rub. No edema. GI: Soft, Non-tender. Non-distended. +BS  Skin: Warm, dry, w/o erythema. Lymph: No cervical or supraclavicular LAD. M/S: No cyanosis. No clubbing. Neuro:  no focal neurologic deficit. Moves all extremities  Psych: Awake and alert, Oriented x 3. Mood stable.       Medications:    Scheduled Meds:   sodium chloride flush  10 mL Intravenous 2 times per day    apixaban  10 mg Oral BID       Continuous Infusions:   sodium chloride 50 mL/hr at 07/12/20 2300       PRN Meds:  sodium chloride flush, acetaminophen, LORazepam **OR** LORazepam **OR** LORazepam **OR** LORazepam **OR** LORazepam **OR** LORazepam **OR** LORazepam **OR** LORazepam, polyethylene glycol, HYDROmorphone, ketorolac, fentanNYL, ondansetron **OR** ondansetron, diphenhydrAMINE    Labs reviewed:  CBC:   Recent Labs     07/12/20  1749 07/12/20  2324 07/13/20  0619   WBC 5.0 4.7 4.4   HGB 7.8* 7.9* 7.4*   HCT 24.5* 24.0* 23.0*   MCV 96.5 97.4 96.4   PLT 68* 70* 70*     BMP:   Recent Labs     07/11/20  1300 07/12/20  1000 07/13/20  0619   * 133* 136   K 4.0 3.8 3.7   CL 97* 96* 100   CO2 26 26 24   BUN 5* 6* 6*   CREATININE 0.5* <0.5* <0.5*     LIVER PROFILE:   Recent Labs     07/11/20  1300 07/12/20  1000 07/13/20  0619   AST 57* 38* 74*   ALT 60* 46* 41*   BILIDIR  --   --  0.3   BILITOT 0.3 0.3 0.4   ALKPHOS 87 77 102     PT/INR:   Recent Labs     07/12/20  1155 07/12/20 1749 07/12/20 2324   PROTIME 12.8 12.3 11.9   INR 1.10 1.06 1.03     APTT:   Recent Labs     07/12/20  1749 07/12/20  2324 07/13/20 0619   APTT 31.3 30.0 30.4     UA:No results for input(s): NITRITE, COLORU, PHUR, LABCAST, WBCUA, RBCUA, MUCUS, TRICHOMONAS, YEAST, BACTERIA, CLARITYU, SPECGRAV, LEUKOCYTESUR, UROBILINOGEN, BILIRUBINUR, BLOODU, GLUCOSEU, AMORPHOUS in the last 72 hours. Invalid input(s): [place] Mage  No results for input(s): PH, PCO2, PO2 in the last 72 hours. Films:  Radiology Review:  Pertinent images / reports were reviewed as a part of this visit. CT Chest w/ contrast: No results found for this or any previous visit. CT Chest w/o contrast: No results found for this or any previous visit. CTPA: No results found for this or any previous visit. CXR PA/LAT:   Results for orders placed during the hospital encounter of 07/09/20   XR CHEST STANDARD (2 VW)    Narrative EXAMINATION:  TWO XRAY VIEWS OF THE CHEST    7/9/2020 5:49 pm    COMPARISON:  None. HISTORY:  ORDERING SYSTEM PROVIDED HISTORY: Sepsis  TECHNOLOGIST PROVIDED HISTORY:  Reason for exam:->Sepsis  Reason for Exam: sepsis    FINDINGS:  Normal cardiac size. No evidence of pneumonia, edema or other acute  pulmonary process. No evidence of acute process of the cardiac or mediastinal  structures. No evidence of pneumothorax or pleural effusion. Impression No evidence of acute cardiopulmonary disease.          CXR portable:   Results for orders placed during the hospital encounter of 07/09/20   XR CHEST PORTABLE    Narrative EXAMINATION:  ONE XRAY VIEW OF THE CHEST    7/13/2020 12:41 pm    COMPARISON:  July 9, 2020    HISTORY:  ORDERING SYSTEM PROVIDED HISTORY: hypoxemia  TECHNOLOGIST PROVIDED HISTORY:  Reason for exam:->hypoxemia  Reason for Exam: hypoxemia    FINDINGS:  New elevation of the right diaphragm is present. Adjacent bandlike areas  airspace disease in the right lung base. No evidence of pneumonia elsewhere. No pneumothorax and no definitive pleural effusion. Minimal left-sided  atelectasis. Impression New elevation of the right diaphragm nearly to the hilum with adjacent  atelectasis and associated volume loss of the right thorax. Access  Arterial       PICC           CVC                 Assessment:     Acute Metabolic Encephalopathy  Alcohol Withdrawal  LLE DVT    Plan:      -/sp EKOS  -scheduled beers with meals  -also on ciwa protocol  -d/c precedex  -folic acid/thiamine    We will sign off at this time.     Thank you for this consult,    Britney Michael [facility]

## 2020-07-13 NOTE — PRE SEDATION
Sedation Pre-Procedure Note    Patient Name: Joby Magallanes   YOB: 1955  Room/Bed: L7Y-8119/1312-01  Medical Record Number: [id number]  Date: 7/13/2020   Time: 6:21 PM       Indication:  DVT    Consent: I have discussed with the patient and/or the patient representative the indication, alternatives, and the possible risks and/or complications of the planned procedure and the anesthesia methods. The patient and/or patient representative appear to understand and agree to proceed. Vital Signs:   Vitals:    07/13/20 1600   BP: (!) 84/52   Pulse: 66   Resp: 14   Temp: 98.2 °F (36.8 °C)   SpO2: 100%       Past Medical History:   has a past medical history of Hyperlipidemia and Seizures ([facility] [address] Past Surgical History:   has no past surgical history on file. Medications:   Scheduled Meds:    sodium chloride flush  10 mL Intravenous 2 times per day    apixaban  10 mg Oral BID    folic acid  1 mg Oral Daily    thiamine  100 mg Oral Daily    multivitamin  1 tablet Oral Daily    ipratropium-albuterol  1 ampule Inhalation Q4H WA     Continuous Infusions:    sodium chloride 50 mL/hr at 07/12/20 2300     PRN Meds: sodium chloride flush, acetaminophen, LORazepam **OR** LORazepam **OR** LORazepam **OR** LORazepam **OR** LORazepam **OR** LORazepam **OR** LORazepam **OR** LORazepam, polyethylene glycol, HYDROmorphone, ketorolac, fentanNYL, ondansetron **OR** ondansetron, diphenhydrAMINE  Home Meds:   Prior to Admission medications    Not on File     Coumadin Use Last 7 Days:  no  Antiplatelet drug therapy use last 7 days: no  Other anticoagulant use last 7 days: yes - TPA  Additional Medication Information:  n/a      Pre-Sedation Documentation and Exam:   I have personally completed a history, physical exam & review of systems for this patient (see notes).     Mallampati Airway Assessment:  Mallampati Class I - (soft palate, fauces, uvula & anterior/posterior tonsillar pillars are visible)    Prior History of Anesthesia Complications:   none    ASA Classification:  Class 3 - A patient with severe systemic disease that limits activity but is not incapacitating    Sedation/ Anesthesia Plan:   intravenous sedation    Medications Planned:   midazolam (Versed) intravenously    Patient is an appropriate candidate for plan of sedation: yes    Electronically signed by Shannon Porter MD on 7/13/2020 at 6:21 PM

## 2020-07-14 PROCEDURE — 6360000002 HC RX W HCPCS: Performed by: HOSPITALIST

## 2020-07-14 PROCEDURE — 6370000000 HC RX 637 (ALT 250 FOR IP): Performed by: HOSPITALIST

## 2020-07-14 PROCEDURE — 6360000002 HC RX W HCPCS: Performed by: INTERNAL MEDICINE

## 2020-07-14 PROCEDURE — 6370000000 HC RX 637 (ALT 250 FOR IP): Performed by: INTERNAL MEDICINE

## 2020-07-14 PROCEDURE — 2060000000 HC ICU INTERMEDIATE R&B

## 2020-07-14 PROCEDURE — 2580000003 HC RX 258: Performed by: INTERNAL MEDICINE

## 2020-07-14 PROCEDURE — 6370000000 HC RX 637 (ALT 250 FOR IP): Performed by: NURSE PRACTITIONER

## 2020-07-14 PROCEDURE — 2700000000 HC OXYGEN THERAPY PER DAY

## 2020-07-14 PROCEDURE — 6360000002 HC RX W HCPCS: Performed by: PEDIATRICS

## 2020-07-14 PROCEDURE — 94640 AIRWAY INHALATION TREATMENT: CPT

## 2020-07-14 PROCEDURE — 94761 N-INVAS EAR/PLS OXIMETRY MLT: CPT

## 2020-07-14 PROCEDURE — 99231 SBSQ HOSP IP/OBS SF/LOW 25: CPT | Performed by: INTERNAL MEDICINE

## 2020-07-14 RX ORDER — PANTOPRAZOLE SODIUM 40 MG/1
40 TABLET, DELAYED RELEASE ORAL
Status: DISCONTINUED | OUTPATIENT
Start: 2020-07-14 | End: 2020-07-16 | Stop reason: HOSPADM

## 2020-07-14 RX ADMIN — HYDROMORPHONE HYDROCHLORIDE 1 MG: 1 INJECTION, SOLUTION INTRAMUSCULAR; INTRAVENOUS; SUBCUTANEOUS at 08:10

## 2020-07-14 RX ADMIN — FOLIC ACID 1 MG: 1 TABLET ORAL at 08:08

## 2020-07-14 RX ADMIN — LORAZEPAM 2 MG: 2 INJECTION INTRAMUSCULAR; INTRAVENOUS at 06:42

## 2020-07-14 RX ADMIN — Medication 100 MG: at 08:08

## 2020-07-14 RX ADMIN — KETOROLAC TROMETHAMINE 15 MG: 15 INJECTION, SOLUTION INTRAMUSCULAR; INTRAVENOUS at 21:39

## 2020-07-14 RX ADMIN — THERA TABS 1 TABLET: TAB at 08:08

## 2020-07-14 RX ADMIN — PANTOPRAZOLE SODIUM 40 MG: 40 TABLET, DELAYED RELEASE ORAL at 12:08

## 2020-07-14 RX ADMIN — APIXABAN 10 MG: 5 TABLET, FILM COATED ORAL at 21:39

## 2020-07-14 RX ADMIN — HYDROMORPHONE HYDROCHLORIDE 1 MG: 1 INJECTION, SOLUTION INTRAMUSCULAR; INTRAVENOUS; SUBCUTANEOUS at 02:31

## 2020-07-14 RX ADMIN — APIXABAN 10 MG: 5 TABLET, FILM COATED ORAL at 08:08

## 2020-07-14 RX ADMIN — FENTANYL CITRATE 50 MCG: 50 INJECTION INTRAMUSCULAR; INTRAVENOUS at 04:45

## 2020-07-14 RX ADMIN — IPRATROPIUM BROMIDE AND ALBUTEROL SULFATE 1 AMPULE: .5; 3 SOLUTION RESPIRATORY (INHALATION) at 08:44

## 2020-07-14 RX ADMIN — SODIUM CHLORIDE, PRESERVATIVE FREE 10 ML: 5 INJECTION INTRAVENOUS at 21:50

## 2020-07-14 RX ADMIN — IPRATROPIUM BROMIDE AND ALBUTEROL SULFATE 1 AMPULE: .5; 3 SOLUTION RESPIRATORY (INHALATION) at 12:15

## 2020-07-14 RX ADMIN — FENTANYL CITRATE 50 MCG: 50 INJECTION INTRAMUSCULAR; INTRAVENOUS at 00:35

## 2020-07-14 RX ADMIN — KETOROLAC TROMETHAMINE 15 MG: 15 INJECTION, SOLUTION INTRAMUSCULAR; INTRAVENOUS at 12:08

## 2020-07-14 RX ADMIN — LORAZEPAM 2 MG: 2 INJECTION INTRAMUSCULAR; INTRAVENOUS at 03:03

## 2020-07-14 RX ADMIN — FENTANYL CITRATE 50 MCG: 50 INJECTION INTRAMUSCULAR; INTRAVENOUS at 14:03

## 2020-07-14 ASSESSMENT — PAIN DESCRIPTION - PAIN TYPE
TYPE: ACUTE PAIN

## 2020-07-14 ASSESSMENT — PAIN DESCRIPTION - ONSET
ONSET: ON-GOING

## 2020-07-14 ASSESSMENT — PAIN DESCRIPTION - ORIENTATION
ORIENTATION: LEFT

## 2020-07-14 ASSESSMENT — PAIN SCALES - GENERAL
PAINLEVEL_OUTOF10: 0
PAINLEVEL_OUTOF10: 9
PAINLEVEL_OUTOF10: 10
PAINLEVEL_OUTOF10: 8
PAINLEVEL_OUTOF10: 10
PAINLEVEL_OUTOF10: 0
PAINLEVEL_OUTOF10: 6
PAINLEVEL_OUTOF10: 8
PAINLEVEL_OUTOF10: 9

## 2020-07-14 ASSESSMENT — PAIN DESCRIPTION - DESCRIPTORS
DESCRIPTORS: ACHING

## 2020-07-14 ASSESSMENT — PAIN DESCRIPTION - LOCATION
LOCATION: LEG

## 2020-07-14 ASSESSMENT — PAIN DESCRIPTION - FREQUENCY
FREQUENCY: CONTINUOUS

## 2020-07-14 ASSESSMENT — PAIN DESCRIPTION - PROGRESSION

## 2020-07-14 ASSESSMENT — PAIN DESCRIPTION - DIRECTION
RADIATING_TOWARDS: L FOOT
RADIATING_TOWARDS: FOOT

## 2020-07-14 NOTE — CARE COORDINATION
LSW rec'd order for Rehab. LSw reviewed chart. LSW spoke with patient over the phone to introduce  role and to initiate discussion regarding DC planning. INITIAL CASE MANAGEMENT ASSESSMENT    Living Situation:   Patient owns a house and lives in an apartment within the house with two other men. One of the men is home 24/7 and the other is out of the house. Chart reflects he has no water in his home which is the reason the chart reflects he is unkempt. When asked about the water, he reports that [address] came to his home to evaluate the issue. He does have WATER BUT HAS  limited hot water but states [employer] reports there is a leak and would need repair. He does not agree with their decision. He reports he owns the property and would need to pay for this himself. He is waiting for [employer] to give him the bill. He has his laundry down at a neighbor's house that he pays to have it done. He ambulates with a wh walker. ADLs:   He gets MOW, has [employer] for homemaking services, [employer]. DME:   wh walker. PT/OT Recs:   NO PT/OT evals as yet. Active Services:    [employer], MOW,      Transportation:    States he has a friend that is a  and can transport him. Medications:   He does not currently take medications but will want to use [facility] as they deliver. PCP:   None. He was agreeable to a new PCP. LSW called [facility] Referral line and secured an appt with Dr Yazmin Rosa for Friday, 7- at 12:30 pm   [address], [address]. HD/PD:    neither    PLAN/COMMENTS:   LSW acknowledged the alcohol use of 1/2 case daily. He is not interested in Alcohol cessation programs at this time. Call placed to [employer] to inquire as to what services he was obtaining. tHEY CONFIRMED HE IS ACTIVE WITH [employer] FOR MOW AND SOME DME LIKE [employer]. HIS HOMEMAKER WAS TERMINATED IN November.         SW/CM provided contact information for patient or family to call with any questions. SW/CM will follow and assist as needed.     [facility], [facility]     Case Management   [phone]    7/14/2020  3:54 PM

## 2020-07-14 NOTE — PROGRESS NOTES
Post procedure site check completed. Procedure site is within normal limits and appears to be free of complications. Small drainage noted on gauze pad. Leg was still hot to touch and swollen. Stated leg was still painful. All concerns were reviewed and questions answered. Patient verbalized understanding.      Electronically signed by Mira Byrd RN on 7/14/2020 at 7:16 AM

## 2020-07-14 NOTE — PROGRESS NOTES
4 Eyes Skin Assessment     The patient is being assess for  Transfer to New Unit    I agree that 2 RN's have performed a thorough Head to Toe Skin Assessment on the patient. ALL assessment sites listed below have been assessed. Areas assessed by both nurses:   [x]   Head, Face, and Ears   [x]   Shoulders, Back, and Chest  [x]   Arms, Elbows, and Hands   [x]   Coccyx, Sacrum, and IschIum  [x]   Legs, Feet, and Heels        Does the Patient have Skin Breakdown?   Yes LDA WOUND CARE was Initiated documentation include the Barbara-wound, Wound Assessment, Measurements, Dressing Treatment, Drainage, and Color\",         Alen Prevention initiated:  Yes   Wound Care Orders initiated:  Yes      [id number]  nurse consulted for Pressure Injury (Stage 3,4, Unstageable, DTI, NWPT, and Complex wounds), New and Established Ostomies:  No      Nurse 1 eSignature: Electronically signed by Tangela Ragland RN on 7/14/20 at 7:00 PM EDT    **SHARE this note so that the co-signing nurse is able to place an eSignature**    Nurse 2 eSignature: Electronically signed by Meenakshi Najera RN on 7/14/20 at 7:05 PM EDT

## 2020-07-14 NOTE — PLAN OF CARE
Problem: Discharge Planning:  Goal: Discharged to appropriate level of care  Description: Discharged to appropriate level of care  Outcome: Met This Shift     Problem: Pain:  Goal: Pain level will decrease  Description: Pain level will decrease  Outcome: Ongoing  Goal: Control of acute pain  Description: Control of acute pain  Outcome: Ongoing     Problem: Nutrition Deficit:  Goal: Ability to achieve adequate nutritional intake will improve  Description: Ability to achieve adequate nutritional intake will improve  Outcome: Ongoing

## 2020-07-14 NOTE — CONSULTS
GASTROENTEROLOGY INPATIENT CONSULTATION      IDENTIFYING DATA/REASON FOR CONSULTATION   PATIENT:  Jose Miguel Lopez  MRN:  [id number]  ADMIT DATE: 7/9/2020  TIME OF EVALUATION: 7/14/2020 2:23 PM  HOSPITAL STAY:   LOS: 4 days     REASON FOR CONSULTATION:  Anemia, NSAID use, weight loss, early satiety      HISTORY OF PRESENT ILLNESS   Jose Miguel Lopez is a 72 y.o. male with a PMH of alcohol abuse, seizures, HLD who presented on 7/9/2020 with left leg swelling. Found to have extensive occlusive DVT involving left external iliac vein, common femoral, profunda, femoral, posterior tibial, perennial, and anterior tibial veins. He underwent catheterization with angioplasty and EKOS tPA catheter placement. He was started on heparin which has since been converted to Eliquis. We have been consulted regarding anemia. Patient denies seeing any blood in his stools or black melenic stools. He denies abdominal pain or history of heartburn symptoms. He drinks 12 beers daily. He has been taking ibuprofen daily for the past couple months. He reports his appetite has been poor lately. He gets full easily. Blood work shows a hemoglobin of 7.4. On admission hgb was 13.8. BUN normal.  Urine tox screen positive for cannabinoid. PAST MEDICAL, SURGICAL, FAMILY, and SOCIAL HISTORY     Past Medical History:   Diagnosis Date    Hyperlipidemia     Seizures ([address]      History reviewed. No pertinent surgical history. History reviewed. No pertinent family history.   Social History     Socioeconomic History    Marital status: Single     Spouse name: None    Number of children: None    Years of education: None    Highest education level: None   Occupational History    None   Social Needs    Financial resource strain: None    Food insecurity     Worry: None     Inability: None    Transportation needs     Medical: None     Non-medical: None   Tobacco Use    Smoking status: Current Every Day Smoker     Packs/day: 1.50 Types: Cigarettes    Smokeless tobacco: Never Used   Substance and Sexual Activity    Alcohol use:  Yes     Alcohol/week: 56.0 standard drinks     Types: 56 Cans of beer per week    Drug use: Yes     Types: Marijuana, IV, Opiates      Comment: Heroin - snorts    Sexual activity: None   Lifestyle    Physical activity     Days per week: None     Minutes per session: None    Stress: None   Relationships    Social connections     Talks on phone: None     Gets together: None     Attends Temple service: None     Active member of club or organization: None     Attends meetings of clubs or organizations: None     Relationship status: None    Intimate partner violence     Fear of current or ex partner: None     Emotionally abused: None     Physically abused: None     Forced sexual activity: None   Other Topics Concern    None   Social History Narrative    None       MEDICATIONS   SCHEDULED:  pantoprazole, 40 mg, QAM AC  sodium chloride flush, 10 mL, 2 times per day  apixaban, 10 mg, BID  folic acid, 1 mg, Daily  thiamine, 100 mg, Daily  multivitamin, 1 tablet, Daily  ipratropium-albuterol, 1 ampule, Q4H WA      FLUIDS/DRIPS:     sodium chloride 50 mL/hr at 07/12/20 2300     PRNs: sodium chloride flush, 10 mL, PRN  acetaminophen, 650 mg, Q4H PRN  LORazepam, 1 mg, Q1H PRN    Or  LORazepam, 1 mg, Q1H PRN    Or  LORazepam, 2 mg, Q1H PRN    Or  LORazepam, 2 mg, Q1H PRN    Or  LORazepam, 3 mg, Q1H PRN    Or  LORazepam, 3 mg, Q1H PRN    Or  LORazepam, 4 mg, Q1H PRN    Or  LORazepam, 4 mg, Q1H PRN  polyethylene glycol, 17 g, Daily PRN  HYDROmorphone, 1 mg, Q4H PRN  ketorolac, 15 mg, Q6H PRN  fentanNYL, 50 mcg, Q4H PRN  ondansetron, 4 mg, Q8H PRN    Or  ondansetron, 4 mg, Q6H PRN  diphenhydrAMINE, 25 mg, Nightly PRN      ALLERGIES:  He No Known Allergies    REVIEW OF SYSTEMS   Pertinent ROS noted in HPI    PHYSICAL EXAM     Vitals:    07/14/20 1200 07/14/20 1215 07/14/20 1216 07/14/20 1300   BP: 124/66   (!) 111/58 Pulse: 77   79   Resp: 12 22 16 12   Temp: 98.5 °F (36.9 °C)      TempSrc: Oral      SpO2: 98% 98% 98% 96%   Weight:       Height:           I/O last 3 completed shifts: In: 1815 [I.V.:1815]  Out: 36 [Urine:925]      Physical Exam:  General appearance: alert, cooperative, no distressEyes: Anicteric  Head: Normocephalic, without obvious abnormality  Lungs: clear to auscultation bilaterally, Normal Effort  Heart: regular rate and rhythm  Abdomen: soft, non-distended, non-tender. Bowel sounds normal. No masses,  no organomegaly. Extremities: LLE edema  Skin: warm and dry, no jaundice  Neuro: Grossly intact, A&OX3      LABS AND IMAGING   Laboratory   Recent Labs     07/12/20  1749 07/12/20  2324 07/13/20  0619   WBC 5.0 4.7 4.4   HGB 7.8* 7.9* 7.4*   HCT 24.5* 24.0* 23.0*   MCV 96.5 97.4 96.4   PLT 68* 70* 70*     Recent Labs     07/12/20  1000 07/13/20  0619   * 136   K 3.8 3.7   CL 96* 100   CO2 26 24   BUN 6* 6*   CREATININE <0.5* <0.5*     Recent Labs     07/12/20  1000 07/13/20  0619   AST 38* 74*   ALT 46* 41*   BILIDIR  --  0.3   BILITOT 0.3 0.4   ALKPHOS 77 102     No results for input(s): LIPASE, AMYLASE in the last 72 hours. Recent Labs     07/12/20  1155 07/12/20  1749 07/12/20  2324   PROTIME 12.8 12.3 11.9   INR 1.10 1.06 1.03       Imaging  XR CHEST PORTABLE   Final Result   New elevation of the right diaphragm nearly to the hilum with adjacent   atelectasis and associated volume loss of the right thorax. VL Extremity Venous Left   Final Result      XR FOOT LEFT (MIN 3 VIEWS)   Final Result   Large amount of soft tissue swelling with no acute or focal bony abnormality   identified. There are couple of punctate radiopaque foreign bodies with project over the   plantar and lateral soft tissues, possibly overlying the patient. Correlation is recommended. XR CHEST STANDARD (2 VW)   Final Result   No evidence of acute cardiopulmonary disease.                ASSESSMENT AND RECOMMENDATIONS   72 y.o. male with a PMH of alcohol abuse, seizures, HLD who presented on 7/9/2020 with left leg swelling. Found to have extensive occlusive DVT. Underwent catheterization with angioplasty and EKOS tPA catheter placement. He was started on heparin which has since been converted to Eliquis. We have been consulted regarding anemia. IMPRESSION:  1. Anemia:  hgb trending down since admission (13->7. 4)  No overt bleeding. Hx of daily NSAID use. BUN normal.   On daily PPI  2. DVT s/p EKOS tPA. Now on Eliquis  3. thrombocytopenia  4. ETOH abuse:  Receiving beers with meals and ativan prn  5. Marijuana use    RECOMMENDATIONS:    Will review case with Dr. Tsering Leal. Continue PPI therapy. If you have any questions or need any further information, please feel free to contact our consult team.  Thank you for allowing us to participate in the care of Stormy Holstein. The note was completed using Dragon voice recognition transcription. Every effort was made to ensure accuracy; however, inadvertent transcription errors may be present despite my best efforts to edit errors. Namita Escobedo PA-C    Attending physician addendum:    I have personally seen and examined the patient, reviewed the patient's medical record and pertinent labs and clinical imaging. I have personally staffed the case with Namita DEGROOT. I agree with her consultation note, exam findings, assessment and plans  as written above. I have made appropriate modifications and edited her assessment and plan where needed to reflect my impression and plans for this patient. Elzbieta Gama is a 72 y.o. male with a PMH of alcohol abuse, seizures, HLD who presented on 7/9/2020 with left leg swelling. Found to have extensive occlusive DVT. He underwent catheterization with angioplasty and EKOS tPA catheter placement. He is currently on Eliquis. He denies any overt GI blood loss. No witness bleeding.  H/H have trended down throughout hospitalization. Recommend keeping on daily PPI. Instructed to stop NSAIDs. I would defer endoscopic evaluation unless overt GI blood loss is noted. Will sign off. Please call back if bleeding is noted. Thank you for allowing me to participate in this patient's care. If there are any questions or concerns regarding this patient, or the plan we have set in place, please feel free to contact me at [phone].      Radha Bartlett MD

## 2020-07-14 NOTE — PROGRESS NOTES
abnormality   identified. There are couple of punctate radiopaque foreign bodies with project over the   plantar and lateral soft tissues, possibly overlying the patient. Correlation is recommended. XR CHEST STANDARD (2 VW)   Final Result   No evidence of acute cardiopulmonary disease. Assessment/Plan:    Active Hospital Problems    Diagnosis Date Noted    Acute deep vein thrombosis (DVT) of left femoral vein (HCC) [I82.412] 07/12/2020    Acute metabolic encephalopathy [Q45.91]     Alcohol withdrawal syndrome, with delirium ([facility] [address] [F10.231]     Cellulitis of left lower extremity [N35.060]     ETOH abuse [F10.10]     Dyslipidemia [E78.5]     Acute deep vein thrombosis (DVT) of iliac vein of left lower extremity (HCC) [I82.422]     Localized swelling of left lower leg [R22.42] 07/09/2020       1) DVT  - ambulation when ok per vascular  - anticoagulation  - s/p removal of EKOS catheter    2) Etoh  - continue alcohol intake, prn ativan required x 2 last night, however alert and oriented today    3) hypoxemia  - improved, not on home O2, down to 1 lpm today    4) AMS  - improved, normal nh4    5) Anemia  - BP trending down, possible dilutional, however with ibuprofen 400mg q 4 x 2 months for leg pain with early satiety and subjective weight loss, will consult GI as patient will require anticoagulation.     DVT Prophylaxis: anticoagulated    Diet: DIET GENERAL;  Code Status: Full Code         Srini Mcclendon MD

## 2020-07-14 NOTE — FLOWSHEET NOTE
07/14/20 1154   Encounter Summary   Services provided to: Patient  (sitter present)   Referral/Consult From: Nurse;Rounding   Support System Family members   Place of [employer]   Continue Visiting   (visit and prayer w/pt 7/14 CL)   Complexity of Encounter Moderate   Length of Encounter 30 minutes   Spiritual/Mormonism   Type Spiritual support   Assessment Calm; Approachable;Helplessness; Despair   Intervention Active listening;Explored feelings, thoughts, concerns;Prayer;Discussed belief system/Sabianist practices/lu;Discussed illness/injury and it's impact  (discussed past and current life situation)   Outcome Engaged in conversation   Electronically signed by Kelsey Hernandez on 7/14/2020 at 11:58 AM

## 2020-07-15 LAB
BASOPHILS ABSOLUTE: 0 K/UL (ref 0–0.2)
BASOPHILS RELATIVE PERCENT: 0.6 %
EOSINOPHILS ABSOLUTE: 0.1 K/UL (ref 0–0.6)
EOSINOPHILS RELATIVE PERCENT: 1.9 %
HCT VFR BLD CALC: 22.3 % (ref 40.5–52.5)
HEMOGLOBIN: 7.5 G/DL (ref 13.5–17.5)
LYMPHOCYTES ABSOLUTE: 1.1 K/UL (ref 1–5.1)
LYMPHOCYTES RELATIVE PERCENT: 19.9 %
MCH RBC QN AUTO: 32.3 PG (ref 26–34)
MCHC RBC AUTO-ENTMCNC: 33.5 G/DL (ref 31–36)
MCV RBC AUTO: 96.6 FL (ref 80–100)
MONOCYTES ABSOLUTE: 0.9 K/UL (ref 0–1.3)
MONOCYTES RELATIVE PERCENT: 15.8 %
NEUTROPHILS ABSOLUTE: 3.3 K/UL (ref 1.7–7.7)
NEUTROPHILS RELATIVE PERCENT: 61.8 %
PDW BLD-RTO: 14 % (ref 12.4–15.4)
PLATELET # BLD: 172 K/UL (ref 135–450)
PMV BLD AUTO: 7.8 FL (ref 5–10.5)
RBC # BLD: 2.31 M/UL (ref 4.2–5.9)
WBC # BLD: 5.4 K/UL (ref 4–11)

## 2020-07-15 PROCEDURE — 94761 N-INVAS EAR/PLS OXIMETRY MLT: CPT

## 2020-07-15 PROCEDURE — 6370000000 HC RX 637 (ALT 250 FOR IP): Performed by: NURSE PRACTITIONER

## 2020-07-15 PROCEDURE — 2580000003 HC RX 258: Performed by: INTERNAL MEDICINE

## 2020-07-15 PROCEDURE — 6370000000 HC RX 637 (ALT 250 FOR IP): Performed by: INTERNAL MEDICINE

## 2020-07-15 PROCEDURE — 97162 PT EVAL MOD COMPLEX 30 MIN: CPT

## 2020-07-15 PROCEDURE — 97530 THERAPEUTIC ACTIVITIES: CPT

## 2020-07-15 PROCEDURE — 6370000000 HC RX 637 (ALT 250 FOR IP): Performed by: HOSPITALIST

## 2020-07-15 PROCEDURE — 85025 COMPLETE CBC W/AUTO DIFF WBC: CPT

## 2020-07-15 PROCEDURE — 97535 SELF CARE MNGMENT TRAINING: CPT

## 2020-07-15 PROCEDURE — 36415 COLL VENOUS BLD VENIPUNCTURE: CPT

## 2020-07-15 PROCEDURE — 2060000000 HC ICU INTERMEDIATE R&B

## 2020-07-15 PROCEDURE — 97166 OT EVAL MOD COMPLEX 45 MIN: CPT

## 2020-07-15 PROCEDURE — 6360000002 HC RX W HCPCS: Performed by: HOSPITALIST

## 2020-07-15 PROCEDURE — 6370000000 HC RX 637 (ALT 250 FOR IP): Performed by: PEDIATRICS

## 2020-07-15 PROCEDURE — 94640 AIRWAY INHALATION TREATMENT: CPT

## 2020-07-15 PROCEDURE — 2580000003 HC RX 258: Performed by: HOSPITALIST

## 2020-07-15 RX ADMIN — SODIUM CHLORIDE, PRESERVATIVE FREE 10 ML: 5 INJECTION INTRAVENOUS at 08:18

## 2020-07-15 RX ADMIN — THERA TABS 1 TABLET: TAB at 08:18

## 2020-07-15 RX ADMIN — FOLIC ACID 1 MG: 1 TABLET ORAL at 08:18

## 2020-07-15 RX ADMIN — IPRATROPIUM BROMIDE AND ALBUTEROL SULFATE 1 AMPULE: .5; 3 SOLUTION RESPIRATORY (INHALATION) at 16:27

## 2020-07-15 RX ADMIN — APIXABAN 10 MG: 5 TABLET, FILM COATED ORAL at 21:16

## 2020-07-15 RX ADMIN — IPRATROPIUM BROMIDE AND ALBUTEROL SULFATE 1 AMPULE: .5; 3 SOLUTION RESPIRATORY (INHALATION) at 19:59

## 2020-07-15 RX ADMIN — IPRATROPIUM BROMIDE AND ALBUTEROL SULFATE 1 AMPULE: .5; 3 SOLUTION RESPIRATORY (INHALATION) at 11:59

## 2020-07-15 RX ADMIN — APIXABAN 10 MG: 5 TABLET, FILM COATED ORAL at 08:18

## 2020-07-15 RX ADMIN — SODIUM CHLORIDE: 9 INJECTION, SOLUTION INTRAVENOUS at 19:29

## 2020-07-15 RX ADMIN — KETOROLAC TROMETHAMINE 15 MG: 15 INJECTION, SOLUTION INTRAMUSCULAR; INTRAVENOUS at 15:53

## 2020-07-15 RX ADMIN — PANTOPRAZOLE SODIUM 40 MG: 40 TABLET, DELAYED RELEASE ORAL at 06:11

## 2020-07-15 RX ADMIN — IPRATROPIUM BROMIDE AND ALBUTEROL SULFATE 1 AMPULE: .5; 3 SOLUTION RESPIRATORY (INHALATION) at 07:55

## 2020-07-15 RX ADMIN — KETOROLAC TROMETHAMINE 15 MG: 15 INJECTION, SOLUTION INTRAMUSCULAR; INTRAVENOUS at 06:14

## 2020-07-15 RX ADMIN — Medication 100 MG: at 08:18

## 2020-07-15 RX ADMIN — DIPHENHYDRAMINE HCL 25 MG: 25 TABLET ORAL at 21:16

## 2020-07-15 ASSESSMENT — PAIN DESCRIPTION - ONSET
ONSET: ON-GOING
ONSET: ON-GOING

## 2020-07-15 ASSESSMENT — PAIN DESCRIPTION - ORIENTATION
ORIENTATION: LEFT
ORIENTATION: LEFT

## 2020-07-15 ASSESSMENT — PAIN DESCRIPTION - DESCRIPTORS
DESCRIPTORS: ACHING
DESCRIPTORS: ACHING

## 2020-07-15 ASSESSMENT — PAIN DESCRIPTION - PAIN TYPE
TYPE: ACUTE PAIN
TYPE: ACUTE PAIN

## 2020-07-15 ASSESSMENT — PAIN SCALES - GENERAL
PAINLEVEL_OUTOF10: 4
PAINLEVEL_OUTOF10: 0
PAINLEVEL_OUTOF10: 3
PAINLEVEL_OUTOF10: 9

## 2020-07-15 ASSESSMENT — PAIN DESCRIPTION - PROGRESSION
CLINICAL_PROGRESSION: NOT CHANGED
CLINICAL_PROGRESSION: NOT CHANGED

## 2020-07-15 ASSESSMENT — PAIN DESCRIPTION - LOCATION
LOCATION: LEG
LOCATION: LEG

## 2020-07-15 ASSESSMENT — PAIN DESCRIPTION - DIRECTION: RADIATING_TOWARDS: FOOT

## 2020-07-15 ASSESSMENT — PAIN DESCRIPTION - FREQUENCY
FREQUENCY: CONTINUOUS
FREQUENCY: CONTINUOUS

## 2020-07-15 ASSESSMENT — PAIN - FUNCTIONAL ASSESSMENT: PAIN_FUNCTIONAL_ASSESSMENT: PREVENTS OR INTERFERES SOME ACTIVE ACTIVITIES AND ADLS

## 2020-07-15 NOTE — PROGRESS NOTES
Physical Therapy    Facility/Department: [address] PROGRESSIVE CARE  Initial Assessment/Treatment Session - COTX    NAME: Ellis Mario  : 1955  MRN: [id number]    Date of Service: 7/15/2020    Discharge Recommendations:  Patient would benefit from continued therapy after discharge, 3-5 sessions per week   PT Equipment Recommendations  Equipment Needed: No    Assessment   Body structures, Functions, Activity limitations: Decreased functional mobility ; Decreased strength;Decreased ROM; Decreased balance;Decreased safe awareness;Decreased high-level IADLs; Increased pain  Assessment: Pt is a 72 y.o. M. admitted  for LE swelling, treated for DVT. He presents with continued LE swelling s/p EKOS, and c/o significant pain, requiring Max a to stand to walker from elevated EOB, and Min A x 2 to take several steps to chair. He would benefit from continued therapy to improve his strength, balance, independence ambulating, and safety awareness. Recommend low-moderate frequency therapy upon D/C. Ellis Mario scored a  on the AM-PAC short mobility form. Current research shows that an AM-PAC score of 17 or less is typically not associated with a discharge to the patient's home setting. Based on the patient's AM-PAC score and their current functional mobility deficits, it is recommended that the patient have 3-5 sessions per week of Physical Therapy at d/c to increase the patient's independence. Please see assessment section for further patient specific details. If patient discharges prior to next session this note will serve as a discharge summary. Please see below for the latest assessment towards goals. Specific instructions for Next Treatment: Ambulate as able; Improve transfers  Prognosis: Fair  Decision Making: Medium Complexity  History: See below  Exam: Strength; ROM; Balance;  Ambulation  Clinical Presentation: Evolving  Barriers to Learning: Fatigue; Pain  REQUIRES PT FOLLOW UP: Responsibilities: No  Ambulation Assistance: Independent(with RW)  Transfer Assistance: Independent  Active : No  Additional Comments: pt reports about 4 falls within the last few months    Objective    AROM RLE (degrees)  RLE AROM: WNL  RLE General AROM: Hip Flex, Knee Flex/Ext, and Ankle PF/DF WFL  AROM LLE (degrees)  LLE AROM : Exceptions  LLE General AROM: Hip Flex moderately limited d/t pain and swelling. Knee Ext lacking ~ 10 from full. Flex at least 90. Ankle PF/DF WFL. AROM RUE (degrees)  RUE AROM : WFL  RUE General AROM: Shoulder Flex ~ 90, Elbow Flex/Ext WFL  AROM LUE (degrees)  LUE AROM : Exceptions  LUE General AROM: Shoulder Flex ~ 60, Elbow Flex/Ext WFL     Strength RLE  Comment: Hip Flex, Knee Flex/Ext, and Ankle PF/DF grossly 3+/5  Strength LLE  Comment: HIp Flex 3-/5, Knee Ext 3-/5, Ankle PF/DF 2+/5  Strength RUE  Comment: Shoulder Flex, Elbow Flex/Ext grossly 3/5  Strength LUE  Comment: Shoulder Flex, Elbow Flex/Ext grossly 3-/5     Tone RLE  RLE Tone: Normotonic  Tone LLE  LLE Tone: Normotonic  Motor Control  Gross Motor?: WFL     Bed mobility  Supine to Sit: Stand by assistance     Transfers  Sit to Stand: Maximum Assistance(max A from elevated EOB to walker;  CGA-SBA to stedy)  Stand to sit: Minimal Assistance  Stand Pivot Transfers: Minimal Assistance;2 Person Assistance(Using walker, limited ability to advance LLE d/t pain, max cues for sequencing)  Comment: Toilet transfer completed with SBA (to/from stedy)     Balance  Comments: Pt able to maintain sitting balance on commode with supervision. He was unable to have a BM, and required assist to don new brief after attempt. Plan   Plan  Times per week: 2-3x  Specific instructions for Next Treatment: Ambulate as able; Improve transfers  Safety Devices  Type of devices:  All fall risk precautions in place, Call light within reach, Chair alarm in place, Gait belt, Left in chair, Nurse notified    [address] Mobility Raw Score : 12 (07/15/20 0905)  AM-PAC Inpatient T-Scale Score : 35.33 (07/15/20 0905)  Mobility Inpatient CMS 0-100% Score: 68.66 (07/15/20 0905)  Mobility Inpatient CMS G-Code Modifier : CL (07/15/20 0364)          Goals  Short term goals  Time Frame for Short term goals: In 2-3 days pt will perform  Short term goal 1: Bed mobility Mod I  Short term goal 2: Transfers Mod A  Short term goal 3: Ambulation 8' with walker, Mod A x 1  Long term goals  Time Frame for Long term goals : LTG = STG  Patient Goals   Patient goals : To return directly home       Therapy Time   Individual Concurrent Group Co-treatment   Time In       0815   Time Out       0900   Minutes       45   Timed Code Treatment Minutes: 30 Minutes   Evaluation time: 15 min.     Lincoln Lynch, PT    Electronically signed by Lincoln Lynch, PT [id number] on 7/15/2020 at 9:07 AM

## 2020-07-15 NOTE — PROGRESS NOTES
Cardiology - PROGRESS NOTE    Admit Date: 7/9/2020     Reason for follow up: DVT    72 y.o. patient with a PMH significant for peripheral artery disease, carotid artery disease, hyperlipidemia presented with complaints of left lower extremity swelling. Social History:   reports that he has been smoking cigarettes. He has been smoking about 1.50 packs per day. He has never used smokeless tobacco. He reports current alcohol use of about 56.0 standard drinks of alcohol per week. He reports current drug use. Drugs: Marijuana, IV, and Opiates . Family History: family history is not on file. Interval History:   S/p EKOS removal   On DOAC   Left leg edema with mild improvement     All other systems reviewed and negative except as above.         Diet: DIET GENERAL;  Pain is:Mild  Nausea:None    In: -   Out: 1230    Wt Readings from Last 3 Encounters:   07/14/20 227 lb 15.3 oz (103.4 kg)   07/17/16 151 lb 14.4 oz (68.9 kg)           Data:   Scheduled Meds:   Scheduled Meds:   pantoprazole  40 mg Oral QAM AC    sodium chloride flush  10 mL Intravenous 2 times per day    apixaban  10 mg Oral BID    folic acid  1 mg Oral Daily    thiamine  100 mg Oral Daily    multivitamin  1 tablet Oral Daily    ipratropium-albuterol  1 ampule Inhalation Q4H WA     Continuous Infusions:   sodium chloride 50 mL/hr at 07/12/20 2300     PRN Meds:.sodium chloride flush, acetaminophen, LORazepam **OR** LORazepam **OR** LORazepam **OR** LORazepam **OR** LORazepam **OR** LORazepam **OR** LORazepam **OR** LORazepam, polyethylene glycol, HYDROmorphone, ketorolac, fentanNYL, ondansetron **OR** ondansetron, diphenhydrAMINE  Continuous Infusions:   sodium chloride 50 mL/hr at 07/12/20 2300       Intake/Output Summary (Last 24 hours) at 7/14/2020 2117  Last data filed at 7/14/2020 2027  Gross per 24 hour   Intake 243 ml   Output 1230 ml   Net -987 ml       CBC:   Recent Labs S1, S2 normal  Abdomen: soft, non-tender; bowel sounds normal; no masses,  no organomegaly  Extremities: LLE pulses DP/PT doppler, increased in edema (+3) , pulses:         Assessment & Plan:    Patient Active Problem List:     Localized swelling of left lower leg     Acute deep vein thrombosis (DVT) of iliac vein of left lower extremity ([address]        Plan:  1. DVT   S/p EKOS   Eliquis load followed by maintenance dose    Repeat venous doppler in 4-6 wks as outpatient     2.  ETOH abuse/withdrawal    Defer to primary team     Cardiology will sign off, please consult PRN   Outpatient follow up in 7-10 days    Lesia Campos MD [address]   (C): [phone]  (O): [phone]

## 2020-07-15 NOTE — CARE COORDINATION
Call from Dylan at [facility] ([phone]) who reports SNF will be able to accept pt once pt is through alcohol withdrawal. Pt will need covid test within 72 hours. Electronically signed by BARBI Harrison LSW on 7/15/2020 at 2:33 PM    Call from MAAME at [facility] ([phone]) who states they will be able to accept pt.  Will need Covid test.     Electronically signed by BARBI Harrison LSW on 7/15/2020 at 4:26 PM

## 2020-07-15 NOTE — PROGRESS NOTES
Hospitalist Progress Note      PCP: No primary care provider on file. Date of Admission: 7/9/2020    Chief Complaint: Leg Pain      Hospital Course: 89F alcoholic with 6 weeks L leg redness / swelling / edema, lactic acid 2.2, started on anticoagulation and CIWA protocol. Vascular consulted for phlegmasia with catheter directed intervention with thrombectomy. EKOS tPA catheter was placed 7/10, developed lethargy 7/12 secondary to withdrawal.    Subjective: pain/swelling in LL ext improved, denies abd pain, sob      Medications:  Reviewed    Infusion Medications    sodium chloride 50 mL/hr at 07/12/20 2300     Scheduled Medications    pantoprazole  40 mg Oral QAM AC    sodium chloride flush  10 mL Intravenous 2 times per day    apixaban  10 mg Oral BID    folic acid  1 mg Oral Daily    thiamine  100 mg Oral Daily    multivitamin  1 tablet Oral Daily    ipratropium-albuterol  1 ampule Inhalation Q4H WA     PRN Meds: sodium chloride flush, acetaminophen, LORazepam **OR** LORazepam **OR** LORazepam **OR** LORazepam **OR** LORazepam **OR** LORazepam **OR** LORazepam **OR** LORazepam, polyethylene glycol, HYDROmorphone, ketorolac, fentanNYL, ondansetron **OR** ondansetron, diphenhydrAMINE      Intake/Output Summary (Last 24 hours) at 7/15/2020 1548  Last data filed at 7/15/2020 0543  Gross per 24 hour   Intake 240 ml   Output 953 ml   Net -713 ml       Physical Exam Performed:    /73   Pulse 98   Temp 99.2 °F (37.3 °C) (Oral)   Resp 18   Ht 6' 3\" (1.905 m)   Wt 207 lb 14.3 oz (94.3 kg)   SpO2 94%   BMI 25.98 kg/m²     General appearance: No apparent distress, appears stated age and cooperative. HEENT: Pupils equal, round, and reactive to light. Conjunctivae/corneas clear. Neck: Supple, with full range of motion. No jugular venous distention. Trachea midline. Respiratory:  Normal respiratory effort.  Diminished breath sounds bilaterally Clear to auscultation, bilaterally without Rales/Wheezes/Rhonchi. Cardiovascular: Regular rate and rhythm with normal S1/S2 without murmurs, rubs or gallops. Abdomen: Soft, non-tender, non-distended with normal bowel sounds. Musculoskeletal: No clubbing, cyanosis (+) edema L leg  Skin: Skin color, texture, turgor normal.  No rashes or lesions. Neurologic:  Neurovascularly intact without any focal sensory/motor deficits. Cranial nerves: II-XII intact, grossly non-focal.  Psychiatric: Alert and oriented, thought content appropriate, normal insight  Capillary Refill: Brisk,< 3 seconds   Peripheral Pulses: +2 palpable, equal bilaterally       Labs:   Recent Labs     07/12/20  1749 07/12/20  2324 07/13/20  0619   WBC 5.0 4.7 4.4   HGB 7.8* 7.9* 7.4*   HCT 24.5* 24.0* 23.0*   PLT 68* 70* 70*     Recent Labs     07/13/20 0619      K 3.7      CO2 24   BUN 6*   CREATININE <0.5*   CALCIUM 7.2*     Recent Labs     07/13/20 0619   AST 74*   ALT 41*   BILIDIR 0.3   BILITOT 0.4   ALKPHOS 102     Recent Labs     07/12/20  1749 07/12/20  2324   INR 1.06 1.03     No results for input(s): CKTOTAL, TROPONINI in the last 72 hours. Urinalysis:      Lab Results   Component Value Date    NITRU Negative 07/09/2020    WBCUA 1 07/09/2020    RBCUA 1 07/09/2020    BLOODU Negative 07/09/2020    SPECGRAV 1.019 07/09/2020    GLUCOSEU Negative 07/09/2020       Radiology:  XR CHEST PORTABLE   Final Result   New elevation of the right diaphragm nearly to the hilum with adjacent   atelectasis and associated volume loss of the right thorax. VL Extremity Venous Left   Final Result      XR FOOT LEFT (MIN 3 VIEWS)   Final Result   Large amount of soft tissue swelling with no acute or focal bony abnormality   identified. There are couple of punctate radiopaque foreign bodies with project over the   plantar and lateral soft tissues, possibly overlying the patient. Correlation is recommended.          XR CHEST STANDARD (2 VW)   Final Result   No evidence of acute cardiopulmonary disease.                  Assessment/Plan:    Active Hospital Problems    Diagnosis Date Noted    Acute deep vein thrombosis (DVT) of left femoral vein (HCC) [I82.412] 07/12/2020    Acute metabolic encephalopathy [X71.22]     Alcohol withdrawal syndrome, with delirium ([facility] [address] [F10.231]     Cellulitis of left lower extremity [L03.116]     ETOH abuse [F10.10]     Dyslipidemia [E78.5]     Acute deep vein thrombosis (DVT) of iliac vein of left lower extremity (HCC) [I82.422]     Localized swelling of left lower leg [R22.42] 07/09/2020       1) DVT  - ambulation when ok per vascular  - anticoagulation  - s/p removal of EKOS catheter    2) Etoh  - continue alcohol intake, prn ativan required x 2 last night, however alert and oriented today    3) hypoxemia  - improved, not on home O2, down to 1 lpm today    4) AMS  - improved, normal nh4    5) Anemia  - monitor, no further eval unless symptomatic    DVT Prophylaxis: anticoagulated    Diet: DIET GENERAL;  Code Status: Full Code         Dispo - likely dc zenaida Arias MD

## 2020-07-15 NOTE — PROGRESS NOTES
Refusing to let nurse do covid test.     Electronically signed by Hardeep Finn RN on 7/15/2020 at 6:37 PM

## 2020-07-15 NOTE — PROGRESS NOTES
FOLLOW UP: Yes  Activity Tolerance  Activity Tolerance: Patient limited by fatigue;Patient limited by pain  Activity Tolerance: pt reporting increased pain in L knee with standing, limiting further mobility in room  Safety Devices  Safety Devices in place: Yes  Type of devices: Left in chair;Call light within reach;Nurse notified; Chair alarm in place           Patient Diagnosis(es): The primary encounter diagnosis was Cellulitis of left lower extremity. A diagnosis of Deficit of personal bathing and hygiene was also pertinent to this visit. has a past medical history of Hyperlipidemia and Seizures ([facility] [address] has no past surgical history on file. Treatment Diagnosis: decreased ADLs and transfers secondary to LE swelling      Restrictions  Restrictions/Precautions  Restrictions/Precautions: Fall Risk  Position Activity Restriction  Other position/activity restrictions: CIWA    Subjective   General  Chart Reviewed: Yes  Patient assessed for rehabilitation services?: Yes  Additional Pertinent Hx: Pt admitted to ED with c/o L LE swelling. Pt found with DVT. Angioplasty and EKOS tPA catheter placement on 7/10. PMHx includes:hyperlipidemia and seizures  Family / Caregiver Present: No  Referring Practitioner: Dorothy  Diagnosis: L LE swelling  Subjective  Subjective: Pt semi supine in bed upon arrival, agreeable to OT eval and treat with encouragement.     Social/Functional History  Social/Functional History  Lives With: Alone  Type of Home: House  Home Layout: One level  Home Access: Stairs to enter with rails  Entrance Stairs - Number of Steps: 8  Entrance Stairs - Rails: Right  Bathroom Shower/Tub: Tub/Shower unit  Bathroom Toilet: Standard  Bathroom Equipment: Grab bars in shower, Grab bars around toilet  Bathroom Accessibility: Walker accessible  Home Equipment: Rolling walker, Cane  ADL Assistance: Independent(pt does not have hot water so he was not bathing)  Homemaking Assistance: (neighbor does laundry, pt microwaves, has MOW)  Homemaking Responsibilities: No  Ambulation Assistance: Independent(with RW)  Transfer Assistance: Independent  Active : No  Additional Comments: pt reports about 4 falls within the last few months       Objective   Vision: Impaired  Vision Exceptions: Wears glasses for reading  Hearing: Within functional limits    Orientation  Overall Orientation Status: Within Functional Limits     Balance  Sitting Balance: Dependent/Total(min A x 2 standing at RW during mobility)  Standing Balance: Stand by assistance  Standing Balance  Time: 8 mins total  Activity: mobility from bed to chair, standing at [facility], standing to don brief  Functional Mobility  Functional - Mobility Device: Rolling Walker  Activity: (short distance from bed to chair)  Assist Level: Dependent/Total(min A x 2)  Functional Mobility Comments: pt not following commands for appropriate LE placement, difficulty advancing L LE forward into walker  Toilet Transfers  Toilet - Technique: (with pete ca)  Equipment Used: Raised toilet seat with rails  Toilet Transfer: Stand by assistance;Contact guard assistance(with peteantelmo ca)  ADL  Feeding: Setup; Beverage management  LE Dressing: Dependent/Total(to don brief)  Toileting: Dependent/Total           Transfers  Sit to stand: Maximum assistance  Transfer Comments: STS at [address] [place] [address] with SBA-CGA     Cognition  Overall Cognitive Status: Exceptions  Arousal/Alertness: Appropriate responses to stimuli  Insights: Not aware of deficits  Initiation: Requires cues for some  Sequencing: Requires cues for some        Sensation  Overall Sensation Status: WFL        LUE AROM (degrees)  LUE General AROM: limited 2/2 shoulder injury  RUE AROM (degrees)  RUE AROM : WFL              Treatment included functional transfer training, ADLs, and patient education.           Plan   Plan  Times per week: 3-5  Times per day: Daily  Current Treatment Recommendations: Strengthening, ROM, Balance Training,

## 2020-07-15 NOTE — PLAN OF CARE
Problem: Pain:  Goal: Pain level will decrease  Description: Pain level will decrease  7/14/2020 2304 by Jerad Brice RN  Outcome: Ongoing  7/14/2020 1619 by Fiona Landaverde RN  Outcome: Ongoing  Goal: Control of acute pain  Description: Control of acute pain  7/14/2020 2304 by Jerad Brice RN  Outcome: Ongoing  7/14/2020 1619 by Fiona Landaverde RN  Outcome: Ongoing  Goal: Control of chronic pain  Description: Control of chronic pain  Outcome: Ongoing     Problem: Falls - Risk of:  Goal: Will remain free from falls  Description: Will remain free from falls  Outcome: Ongoing  Goal: Absence of physical injury  Description: Absence of physical injury  Outcome: Ongoing     Problem: Bleeding:  Goal: Will show no signs and symptoms of excessive bleeding  Description: Will show no signs and symptoms of excessive bleeding  Outcome: Ongoing     Problem: Discharge Planning:  Goal: Discharged to appropriate level of care  Description: Discharged to appropriate level of care  7/14/2020 2304 by Jerad Brice RN  Outcome: Ongoing  7/14/2020 1619 by Fiona Landaverde RN  Outcome: Met This Shift     Problem: Fluid Volume - Deficit:  Goal: Absence of fluid volume deficit signs and symptoms  Description: Absence of fluid volume deficit signs and symptoms  Outcome: Ongoing     Problem: Nutrition Deficit:  Goal: Ability to achieve adequate nutritional intake will improve  Description: Ability to achieve adequate nutritional intake will improve  7/14/2020 2304 by Jerad Brice RN  Outcome: Ongoing  7/14/2020 1619 by Fiona Landaverde RN  Outcome: Ongoing     Problem: Sleep Pattern Disturbance:  Goal: Appears well-rested  Description: Appears well-rested  Outcome: Ongoing

## 2020-07-15 NOTE — CARE COORDINATION
Per chart review PT / OT notes:    Neetu Rey scored a 12/24 on the AM-PAC short mobility form. Current research shows that an AM-PAC score of 17 or less is typically not associated with a discharge to the patient's home setting. Based on the patient's AM-PAC score and their current functional mobility deficits, it is recommended that the patient have 3-5 sessions per week of Physical Therapy at d/c to increase the patient's independence.  Please see assessment section for further patient specific details. Neetu Rey scored a 14/24 on the AM-PAC ADL Inpatient form. Current research shows that an AM-PAC score of 17 or less is typically not associated with a discharge to the patient's home setting. Based on the patient's AM-PAC score and their current ADL deficits, it is recommended that the patient have 3-5 sessions per week of Occupational Therapy at d/c to increase the patient's independence. Sw met with pt to discuss SNF referral. Pt agreeable at this time. Sw provided pt with list of SNF, pt denied need of list and reports he wants SW to refer to SNF close to his home address in [facility]. Pt reports he has been to a SNF on [address] but does not remember the name of the facility, pt does not want to return to this SNF. Based on location Sw sent referrals to the following SNF:  Diania Dancer, Maren Okeefe, [facility] and [facility] [address] for Transition of Care is related to the following treatment goals: increase patient's independence     The Patient was provided with a choice of provider and agrees   with the discharge plan. [x] Yes [] No    Freedom of choice list was provided with basic dialogue that supports the patient's individualized plan of care/goals, treatment preferences and shares the quality data associated with the providers.  [x] Yes [] No    Electronically signed by BARBI Cosby, ANH on 7/15/2020 at 1:16 PM

## 2020-07-15 NOTE — PROGRESS NOTES
Clinical Pharmacy Note  Medication Counseling    Reviewed new medications started during hospital admission: eliquis, , . Indications and side effects were emphasized during counseling. All medication-related questions addressed. Patient verbalized understanding of education. Should the patient express any additional questions or concerns regarding their medications, please do not hesitate to contact the pharmacy department. Patient/caregiver aware they may refuse medications during hospital stay. 10 minutes spent educating patient regarding medications.

## 2020-07-16 VITALS
BODY MASS INDEX: 27.93 KG/M2 | HEART RATE: 75 BPM | TEMPERATURE: 98.2 F | SYSTOLIC BLOOD PRESSURE: 139 MMHG | HEIGHT: 75 IN | RESPIRATION RATE: 16 BRPM | WEIGHT: 224.65 LBS | DIASTOLIC BLOOD PRESSURE: 89 MMHG | OXYGEN SATURATION: 96 %

## 2020-07-16 LAB — SARS-COV-2, NAAT: NOT DETECTED

## 2020-07-16 PROCEDURE — 94761 N-INVAS EAR/PLS OXIMETRY MLT: CPT

## 2020-07-16 PROCEDURE — 6370000000 HC RX 637 (ALT 250 FOR IP): Performed by: HOSPITALIST

## 2020-07-16 PROCEDURE — 6370000000 HC RX 637 (ALT 250 FOR IP): Performed by: NURSE PRACTITIONER

## 2020-07-16 PROCEDURE — 6360000002 HC RX W HCPCS: Performed by: HOSPITALIST

## 2020-07-16 PROCEDURE — 6370000000 HC RX 637 (ALT 250 FOR IP): Performed by: INTERNAL MEDICINE

## 2020-07-16 PROCEDURE — U0002 COVID-19 LAB TEST NON-CDC: HCPCS

## 2020-07-16 PROCEDURE — 94640 AIRWAY INHALATION TREATMENT: CPT

## 2020-07-16 RX ORDER — FOLIC ACID 1 MG/1
1 TABLET ORAL DAILY
Qty: 30 TABLET | Refills: 3 | Status: SHIPPED | OUTPATIENT
Start: 2020-07-17

## 2020-07-16 RX ORDER — LANOLIN ALCOHOL/MO/W.PET/CERES
100 CREAM (GRAM) TOPICAL DAILY
Qty: 30 TABLET | Refills: 3 | Status: SHIPPED | OUTPATIENT
Start: 2020-07-17

## 2020-07-16 RX ORDER — PANTOPRAZOLE SODIUM 40 MG/1
40 TABLET, DELAYED RELEASE ORAL
Qty: 30 TABLET | Refills: 3 | Status: SHIPPED | OUTPATIENT
Start: 2020-07-17

## 2020-07-16 RX ORDER — MULTIVITAMIN WITH IRON
1 TABLET ORAL DAILY
Qty: 30 TABLET | Refills: 0 | Status: SHIPPED | OUTPATIENT
Start: 2020-07-17

## 2020-07-16 RX ADMIN — APIXABAN 10 MG: 5 TABLET, FILM COATED ORAL at 08:06

## 2020-07-16 RX ADMIN — FOLIC ACID 1 MG: 1 TABLET ORAL at 08:06

## 2020-07-16 RX ADMIN — HYDROMORPHONE HYDROCHLORIDE 1 MG: 1 INJECTION, SOLUTION INTRAMUSCULAR; INTRAVENOUS; SUBCUTANEOUS at 14:28

## 2020-07-16 RX ADMIN — IPRATROPIUM BROMIDE AND ALBUTEROL SULFATE 1 AMPULE: .5; 3 SOLUTION RESPIRATORY (INHALATION) at 07:50

## 2020-07-16 RX ADMIN — IPRATROPIUM BROMIDE AND ALBUTEROL SULFATE 1 AMPULE: .5; 3 SOLUTION RESPIRATORY (INHALATION) at 15:56

## 2020-07-16 RX ADMIN — Medication 100 MG: at 08:06

## 2020-07-16 RX ADMIN — HYDROMORPHONE HYDROCHLORIDE 1 MG: 1 INJECTION, SOLUTION INTRAMUSCULAR; INTRAVENOUS; SUBCUTANEOUS at 10:18

## 2020-07-16 RX ADMIN — PANTOPRAZOLE SODIUM 40 MG: 40 TABLET, DELAYED RELEASE ORAL at 06:39

## 2020-07-16 RX ADMIN — THERA TABS 1 TABLET: TAB at 08:06

## 2020-07-16 RX ADMIN — HYDROMORPHONE HYDROCHLORIDE 1 MG: 1 INJECTION, SOLUTION INTRAMUSCULAR; INTRAVENOUS; SUBCUTANEOUS at 19:19

## 2020-07-16 RX ADMIN — IPRATROPIUM BROMIDE AND ALBUTEROL SULFATE 1 AMPULE: .5; 3 SOLUTION RESPIRATORY (INHALATION) at 12:04

## 2020-07-16 ASSESSMENT — PAIN - FUNCTIONAL ASSESSMENT
PAIN_FUNCTIONAL_ASSESSMENT: PREVENTS OR INTERFERES SOME ACTIVE ACTIVITIES AND ADLS
PAIN_FUNCTIONAL_ASSESSMENT: PREVENTS OR INTERFERES SOME ACTIVE ACTIVITIES AND ADLS

## 2020-07-16 ASSESSMENT — PAIN DESCRIPTION - ORIENTATION
ORIENTATION: LEFT
ORIENTATION: LEFT

## 2020-07-16 ASSESSMENT — PAIN DESCRIPTION - PROGRESSION
CLINICAL_PROGRESSION: GRADUALLY WORSENING
CLINICAL_PROGRESSION: GRADUALLY IMPROVING

## 2020-07-16 ASSESSMENT — PAIN SCALES - GENERAL
PAINLEVEL_OUTOF10: 10
PAINLEVEL_OUTOF10: 0
PAINLEVEL_OUTOF10: 3
PAINLEVEL_OUTOF10: 0
PAINLEVEL_OUTOF10: 10
PAINLEVEL_OUTOF10: 10
PAINLEVEL_OUTOF10: 0

## 2020-07-16 ASSESSMENT — PAIN DESCRIPTION - FREQUENCY
FREQUENCY: CONTINUOUS
FREQUENCY: CONTINUOUS

## 2020-07-16 ASSESSMENT — PAIN DESCRIPTION - ONSET
ONSET: ON-GOING
ONSET: ON-GOING

## 2020-07-16 ASSESSMENT — PAIN DESCRIPTION - PAIN TYPE
TYPE: ACUTE PAIN
TYPE: ACUTE PAIN

## 2020-07-16 ASSESSMENT — PAIN DESCRIPTION - LOCATION
LOCATION: LEG
LOCATION: LEG

## 2020-07-16 ASSESSMENT — PAIN DESCRIPTION - DESCRIPTORS
DESCRIPTORS: ACHING
DESCRIPTORS: ACHING

## 2020-07-16 NOTE — PLAN OF CARE
Problem: Pain:  Goal: Pain level will decrease  Description: Pain level will decrease  7/16/2020 1105 by Teena Mott RN  Outcome: Ongoing     Problem: Pain:  Goal: Control of acute pain  Description: Control of acute pain  7/16/2020 1105 by Teena Mott RN  Outcome: Ongoing     Problem: Pain:  Goal: Control of chronic pain  Description: Control of chronic pain  7/16/2020 1105 by Teena Mott RN  Outcome: Ongoing     Problem: Falls - Risk of:  Goal: Will remain free from falls  Description: Will remain free from falls  7/16/2020 1105 by Teena Mott RN  Outcome: Ongoing     Problem: Falls - Risk of:  Goal: Absence of physical injury  Description: Absence of physical injury  7/16/2020 1105 by Teena Mott RN  Outcome: Ongoing

## 2020-07-16 NOTE — CARE COORDINATION
CASE MANAGEMENT DISCHARGE SUMMARY:    DISCHARGE DATE: 7/16/2020    DISCHARGED TO: [place] [address]                 REPORT NUMBER: [phone]               FAX NUMBER: [phone]    TRANSPORTATION: [address] [place] [address] Street: 7:30-8:00p    INSURANCE PRECERT OBTAINED: n/a    JORGE/MENA COMPLETED: yes - faxed to Lacho Sosa at [phone]    Sw call to [facility] at [place] [address] to inform of transport and dc order.    AVS faxed to [phone] per request from [facility]    Electronically signed by BARBI Stevens, LSW on 7/16/2020 at 3:05 PM

## 2020-07-16 NOTE — CARE COORDINATION
Spoke with patient in the room regarding rehab options. He is now in agreement to go to [facility] for rehab. He states he had a hard time getting into the bathroom the AM and knows he needs to get stronger.       Electronically signed by Edgar Horton RN on 7/16/2020 at 10:58 AM

## 2020-07-16 NOTE — DISCHARGE INSTR - COC
Continuity of Care Form    Patient Name: Andrews Vela   :  1955  MRN:  [id number]    Admit date:  2020  Discharge date:      Code Status Order: Full Code   Advance Directives:   [facility] Documentation     Date/Time Healthcare Directive Type of Healthcare Directive Copy in [facility] [address] Agent's Name Healthcare Agent's Phone Number    07/10/20 0404  Yes, patient has an advance directive for healthcare treatment  --  --  --  --  --          Admitting Physician:  Bao Mckeon MD  PCP: No primary care provider on file. Discharging Nurse: [facility] Unit/Room#: 100 [facility] Unit Phone Number: [phone]    Emergency Contact:   Extended Emergency Contact Information  Primary Emergency Contact: Faheem Lagos [address] Phone: [phone]  Mobile Phone: [phone]  Relation: Other    Past Surgical History:  History reviewed. No pertinent surgical history. Immunization History:   Immunization History   Administered Date(s) Administered    Tdap (Boostrix, Adacel) 2016       Active Problems:  Patient Active Problem List   Diagnosis Code    Localized swelling of left lower leg R22.42    Acute deep vein thrombosis (DVT) of iliac vein of left lower extremity ([facility]) I82.422    Cellulitis of left lower extremity L03. 80    ETOH abuse F10.10    Dyslipidemia E78.5    Acute deep vein thrombosis (DVT) of left femoral vein ([facility]) I82.412    Acute metabolic encephalopathy M25.67    Alcohol withdrawal syndrome, with delirium ([facility]) F10.231       Isolation/Infection:   Isolation          No Isolation        Patient Infection Status     Infection Onset Added Last Indicated Last Indicated By Review Planned Expiration Resolved Resolved By    COVID-19 Rule Out 20 COVID-19 (Ordered)        Resolved    COVID-19 Rule Out 20 COVID-19 (Ordered)   20 Rule-Out Test Resulted          Nurse Assessment:  Last Vital Signs: BP (!) 145/80   Pulse 82   Temp 98.1 °F (36.7 °C) (Oral)   Resp 20   Ht 6' 3\" (1.905 m)   Wt 224 lb 10.4 oz (101.9 kg)   SpO2 93%   BMI 28.08 kg/m²     Last documented pain score (0-10 scale): Pain Level: 10  Last Weight:   Wt Readings from Last 1 Encounters:   07/16/20 224 lb 10.4 oz (101.9 kg)     Mental Status:  oriented and alert    IV Access:  - None    Nursing Mobility/ADLs:  Walking   Assisted  Transfer  Assisted  Bathing  Independent  Dressing  Independent  Toileting  Assisted  Feeding  Independent  Med Admin  Independent  Med Delivery   whole    Wound Care Documentation and Therapy:  Wound 07/10/20 Toe (Comment  which one) Anterior;Left; Inner (Active)   Wound Other 07/14/20 2108   Wound Assessment Clean;Dry; Other (Comment) 07/14/20 1602   Drainage Amount None 07/14/20 1602   Number of days: 6       Wound 07/10/20 Pretibial Left scabbed (Active)   Wound Skin Tear 07/15/20 0817   Dressing/Treatment Open to air 07/15/20 2010   Wound Assessment Clean;Dry;Pink; Other (Comment) 07/14/20 1602   Drainage Amount None 07/14/20 1602   Number of days: 6        Elimination:  Continence:   · Bowel: Yes  · Bladder: Yes  Urinary Catheter: None   Colostomy/Ileostomy/Ileal Conduit: No       Date of Last BM: 7/16    Intake/Output Summary (Last 24 hours) at 7/16/2020 1506  Last data filed at 7/16/2020 1054  Gross per 24 hour   Intake 880 ml   Output 1700 ml   Net -820 ml     I/O last 3 completed shifts: In: 0 [P.O.:880]  Out: 1700 [Urine:1700]    Safety Concerns:      At Risk for Falls    Impairments/Disabilities:      None    Nutrition Therapy:  Current Nutrition Therapy:   - Oral Diet:  General    Routes of Feeding: Oral  Liquids: allowed up to ten beers a day  Daily Fluid Restriction: no  Last Modified Barium Swallow with Video (Video Swallowing Test): not done    Treatments at the Time of Hospital Discharge:   Respiratory Treatments:   Oxygen Therapy:  is

## 2020-07-16 NOTE — DISCHARGE SUMMARY
[facility]   Discharge Summary    Patient:  Shannen Young  YOB: 1955    MRN: [id number]   Acct: [de-identified]    Primary Care Physician: No primary care provider on file. Admit date:  7/9/2020    Discharge date:   7/16/2020      Discharge Diagnoses: Active Problems:    Localized swelling of left lower leg    Acute deep vein thrombosis (DVT) of iliac vein of left lower extremity (HCC)    Cellulitis of left lower extremity    ETOH abuse    Dyslipidemia    Acute deep vein thrombosis (DVT) of left femoral vein (HCC)    Acute metabolic encephalopathy    Alcohol withdrawal syndrome, with delirium ([facility] [address]           Admitted for: (HPI) leg swelling    Hospital Course:  52W alcoholic with 6 weeks L leg redness / swelling / edema, lactic acid 2.2, started on anticoagulation and CIWA protocol. Vascular consulted for phlegmasia with catheter directed intervention with thrombectomy. EKOS tPA catheter was placed 7/10, removed 7/13 with some reduction in clot burden with clinical improvement of swelling and pain noted. Patient placed on eliquis. Patient does not wish to discontinue drinking alcohol, has been given given beer [facility].      Consultants:  Cardiology - Dr. Catalina De La Fuente    Discharge Medications:       Medication List      START taking these medications    * apixaban 5 MG Tabs tablet  Commonly known as:  ELIQUIS  Take 2 tablets by mouth 2 times daily for 5 days Start after completion of eliquis 10mg bid     * apixaban 5 MG Tabs tablet  Commonly known as:  Eliquis  Take 1 tablet by mouth 2 times daily     folic acid 1 MG tablet  Commonly known as:  FOLVITE  Take 1 tablet by mouth daily  Start taking on:  July 17, 2020     multivitamin Tabs tablet  Take 1 tablet by mouth daily  Start taking on:  July 17, 2020     pantoprazole 40 MG tablet  Commonly known as:  PROTONIX  Take 1 tablet by mouth every morning (before breakfast)  Start taking on:  July 17, 2020     thiamine 100 MG tablet  Take 1 tablet by mouth daily  Start taking on:  July 17, 2020         * This list has 2 medication(s) that are the same as other medications prescribed for you. Read the directions carefully, and ask your doctor or other care provider to review them with you. Where to Get Your Medications      These medications were sent to [facility] [address], [address] - F [phone]  [address] [address] [facility] [address] [place] [address]    Phone:  [phone]   · apixaban 5 MG Tabs tablet  · apixaban 5 MG Tabs tablet  · folic acid 1 MG tablet  · multivitamin Tabs tablet  · pantoprazole 40 MG tablet  · thiamine 100 MG tablet           Physical Exam:    Vitals:  Vitals:    07/16/20 0752 07/16/20 0804 07/16/20 1128 07/16/20 1206   BP:  (!) 148/83 (!) 145/80    Pulse:  76 82    Resp: 20 18 16 20   Temp:  98.3 °F (36.8 °C) 98.1 °F (36.7 °C)    TempSrc:  Oral Oral    SpO2: 93% 95% 95% 93%   Weight:       Height:         Weight: Weight: 224 lb 10.4 oz (101.9 kg)     24 hour intake/output:    Intake/Output Summary (Last 24 hours) at 7/16/2020 1506  Last data filed at 7/16/2020 1054  Gross per 24 hour   Intake 880 ml   Output 1700 ml   Net -820 ml       General appearance - alert, well appearing, and in no distress  Chest - clear to auscultation, no wheezes, rales or rhonchi, symmetric air entry  Heart - normal rate, regular rhythm, normal S1, S2, no murmurs, rubs, clicks or gallops  Abdomen - soft, nontender, nondistended, no masses or organomegaly  Obese: No; Protuberant: No   Neurological - alert, oriented, normal speech, no focal findings or movement disorder noted  Extremities - peripheral pulses normal, (+) L leg edema, no clubbing or cyanosis  Skin - normal coloration and turgor   Radiology reports as per the Radiologist  Radiology: Xr Chest Standard (2 Vw)    Result Date: 7/9/2020  EXAMINATION: TWO XRAY VIEWS OF THE CHEST 7/9/2020 5:49 pm COMPARISON: None.  HISTORY: ORDERING SYSTEM PROVIDED HISTORY: Sepsis TECHNOLOGIST PROVIDED HISTORY: Reason for exam:->Sepsis Reason for Exam: sepsis FINDINGS: Normal cardiac size. No evidence of pneumonia, edema or other acute pulmonary process. No evidence of acute process of the cardiac or mediastinal structures. No evidence of pneumothorax or pleural effusion. No evidence of acute cardiopulmonary disease. Xr Foot Left (min 3 Views)    Result Date: 7/9/2020  EXAMINATION: THREE XRAY VIEWS OF THE LEFT FOOT 7/9/2020 10:35 pm COMPARISON: None available HISTORY: ORDERING SYSTEM PROVIDED HISTORY: foot wounds TECHNOLOGIST PROVIDED HISTORY: Reason for exam:->foot wounds Reason for Exam: foot wounds FINDINGS: Large amount of soft tissue swelling surrounds the foot. The bones show degenerative changes, particularly of the tarsometatarsal joints, 1st metatarsophalangeal joint, and interphalangeal joints, with joint space loss articular sclerosis and spurring. No acute fracture or dislocation is identified. There are a few punctate radiopaque foreign bodies which project over the plantar and lateral soft tissues, possibly overlying the patient. Arterial vascular calcifications are seen within the soft tissues. Large amount of soft tissue swelling with no acute or focal bony abnormality identified. There are couple of punctate radiopaque foreign bodies with project over the plantar and lateral soft tissues, possibly overlying the patient. Correlation is recommended.      Vl Extremity Venous Left    Result Date: 7/10/2020  Lower Extremities DVT Study  Demographics   Patient Name       Abhijeet PAREDES   Date of Study      07/10/2020         Gender               Male   Patient Number     [id number]         Date of Birth        1955   Visit Number       [id number]          Age                  72 year(s)   Accession Number   [id number]         Room Number          1534   Corporate ID       [id number]            Sonographer          Marcela Hogan RVT Ordering Physician Yg Nayak MD Interpreting         [facility]                                        Physician            Edenilson Koch MD  Procedure Type of Study:   Veins:Lower Extremities DVT Study, VL EXTREMITY VENOUS DUPLEX LEFT. Vascular Sonographer Report  Indications for Study:Swelling. Impressions Left Impression - Acute versus subacute totally occluding deep vein thrombosis involving the left external iliac vein, common femoral, profunda, femoral, posterior tibials, perennials and anterior tibial veins. - There is acute versus subacute partially occluding deep vein thrombosis involving the left popliteal vein. - Acute totally occluding superficial venous thrombosis involving the left greater saphenous vein from zone 1 to zone 8 (the entire length of the leg). - Calf veins were not well visualized on the left due to edema. - There is no other evidence of deep vein or superficial vein thrombosis involving the left lower extremity and the right common femoral vein. Conclusions   Summary   - Acute versus subacute totally occluding deep vein thrombosis involving the  left external iliac vein, common femoral, profunda, femoral, posterior  tibials, perennials and anterior tibial veins. Signature   ------------------------------------------------------------------  Electronically signed by Edenilson Koch MD (Interpreting  physician) on 07/10/2020 at 04:24 PM  ------------------------------------------------------------------  Patient Status:Routine. Study [place] [address] - Vascular Lab. Technical Quality:Adequate visualization.   - Results were reported to:Mora Cee Rn @ 9:29 am. Risk Factors History +---------+----------+------------------------------------------------------+ ! Diagnosis! Date      ! Comments                                              ! +---------+----------+------------------------------------------------------+ ! Other    !07/10/2020! Patient states that his LLE has been swollen for > 1  ! !         !          !month. ! +---------+----------+------------------------------------------------------+   - The patient's risk factor(s) include: arterial hypertension. Velocities are measured in cm/s ; Diameters are measured in mm Right Lower Extremities DVT Study Measurements Right 2D Measurements +--------------+----------+---------------+----------+ ! Location      ! Visualized! Compressibility! Thrombosis! +--------------+----------+---------------+----------+ ! Common Femoral!Yes       ! Yes            ! None      ! +--------------+----------+---------------+----------+ Right Doppler Measurements +--------------+------+------+------------+ ! Location      ! Signal!Reflux! Reflux (sec)! +--------------+------+------+------------+ ! Common Femoral!Phasic! No    !            ! +--------------+------+------+------------+ Left Lower Extremities DVT Study Measurements Left 2D Measurements +------------------------+----------+---------------+----------+ ! Location                ! Visualized! Compressibility! Thrombosis! +------------------------+----------+---------------+----------+ ! Sapheno Femoral Junction! Yes       ! No             !Acute     ! +------------------------+----------+---------------+----------+ ! GSV Thigh               ! Yes       ! No             !Acute     ! +------------------------+----------+---------------+----------+ ! Common Femoral          !Yes       ! No             !Acute     ! +------------------------+----------+---------------+----------+ ! Prox Femoral            !Yes       ! No             !Acute     ! +------------------------+----------+---------------+----------+ ! Mid Femoral             !Yes       ! No             !Acute     ! +------------------------+----------+---------------+----------+ ! Dist Femoral            !Yes       ! No             !Acute     ! +------------------------+----------+---------------+----------+ ! Deep Femoral            !Yes       ! No             !Acute     ! +------------------------+----------+---------------+----------+ ! Popliteal               !Yes       ! Partial        !Acute     ! +------------------------+----------+---------------+----------+ ! Gastroc                 ! Yes       ! Yes            ! None      ! +------------------------+----------+---------------+----------+ ! Soleal                  !Yes       ! No             !Acute     ! +------------------------+----------+---------------+----------+ ! PTV                     ! Partial   !No             !Acute     ! +------------------------+----------+---------------+----------+ ! ATV                     ! Yes       ! No             !Acute     ! +------------------------+----------+---------------+----------+ ! Peroneal                !Partial   !No             !Acute     ! +------------------------+----------+---------------+----------+ ! GSV Calf                ! Yes       ! No             !Acute     ! +------------------------+----------+---------------+----------+ ! SSV                     ! Yes       ! Yes            ! None      ! +------------------------+----------+---------------+----------+ Left Doppler Measurements +--------------+----------+------+------------+ ! Location      ! Signal    !Reflux! Reflux (sec)! +--------------+----------+------+------------+ ! Common Femoral!Absent    !      !            ! +--------------+----------+------+------------+ ! Popliteal     !Continuous!      !            ! +--------------+----------+------+------------+       Results for orders placed or performed during the hospital encounter of 07/09/20   Culture, Blood 2    Specimen: Blood   Result Value Ref Range    Culture, Blood 2 No Growth after 4 days of incubation. Culture, Blood 1    Specimen: Blood   Result Value Ref Range    Blood Culture, Routine No Growth after 4 days of incubation.     CBC Auto Differential   Result Value Ref Range    WBC 5.6 4.0 - 11.0 K/uL    RBC 4. 41 4.20 - 5.90 M/uL    Hemoglobin 13.8 13.5 - 17.5 g/dL    Hematocrit 42.1 40.5 - 52.5 %    MCV 95.4 80.0 - 100.0 fL    MCH 31.3 26.0 - 34.0 pg    MCHC 32.8 31.0 - 36.0 g/dL    RDW 14.0 12.4 - 15.4 %    Platelets 967 093 - 442 K/uL    MPV 7.9 5.0 - 10.5 fL    Neutrophils % 49.7 %    Lymphocytes % 31.6 %    Monocytes % 14.3 %    Eosinophils % 3.8 %    Basophils % 0.6 %    Neutrophils Absolute 2.8 1.7 - 7.7 K/uL    Lymphocytes Absolute 1.8 1.0 - 5.1 K/uL    Monocytes Absolute 0.8 0.0 - 1.3 K/uL    Eosinophils Absolute 0.2 0.0 - 0.6 K/uL    Basophils Absolute 0.0 0.0 - 0.2 K/uL   Basic Metabolic Panel w/ Reflex to MG   Result Value Ref Range    Sodium 136 136 - 145 mmol/L    Potassium reflex Magnesium 4.1 3.5 - 5.1 mmol/L    Chloride 98 (L) 99 - 110 mmol/L    CO2 26 21 - 32 mmol/L    Anion Gap 12 3 - 16    Glucose 111 (H) 70 - 99 mg/dL    BUN 7 7 - 20 mg/dL    CREATININE 0.6 (L) 0.8 - 1.3 mg/dL    GFR Non-African American >60 >60    GFR African American >60 >60    Calcium 9.3 8.3 - 10.6 mg/dL   Lactic Acid, Plasma   Result Value Ref Range    Lactic Acid 2.2 (H) 0.4 - 2.0 mmol/L   Urinalysis Reflex to Culture    Specimen: Urine, clean catch   Result Value Ref Range    Color, UA DK YELLOW Straw/Yellow    Clarity, UA CLOUDY (A) Clear    Glucose, Ur Negative Negative mg/dL    Bilirubin Urine Negative Negative    Ketones, Urine Negative Negative mg/dL    Specific Gravity, UA 1.019 1.005 - 1.030    Blood, Urine Negative Negative    pH, UA 5.5 5.0 - 8.0    Protein, UA Negative Negative mg/dL    Urobilinogen, Urine 0.2 <2.0 E.U./dL    Nitrite, Urine Negative Negative    Leukocyte Esterase, Urine Negative Negative    Microscopic Examination YES     Urine Type Voided     Urine Reflex to Culture Not Indicated    Hepatic Function Panel   Result Value Ref Range    Total Protein 7.0 6.4 - 8.2 g/dL    Alb 3.9 3.4 - 5.0 g/dL    Alkaline Phosphatase 102 40 - 129 U/L     (H) 10 - 40 U/L     (H) 15 - 37 U/L    Total 136 - 145 mmol/L    Potassium 4.0 3.5 - 5.1 mmol/L    Chloride 93 (L) 99 - 110 mmol/L    CO2 28 21 - 32 mmol/L    Anion Gap 15 3 - 16    Glucose 87 70 - 99 mg/dL    BUN 6 (L) 7 - 20 mg/dL    CREATININE <0.5 (L) 0.8 - 1.3 mg/dL    GFR Non-African American >60 >60    GFR African American >60 >60    Calcium 9.2 8.3 - 10.6 mg/dL    Total Protein 6.9 6.4 - 8.2 g/dL    Alb 3.8 3.4 - 5.0 g/dL    Albumin/Globulin Ratio 1.2 1.1 - 2.2    Total Bilirubin 0.6 0.0 - 1.0 mg/dL    Alkaline Phosphatase 99 40 - 129 U/L    ALT 95 (H) 10 - 40 U/L    AST 93 (H) 15 - 37 U/L    Globulin 3.1 g/dL   Uric Acid   Result Value Ref Range    Uric Acid, Serum 5.4 3.5 - 7.2 mg/dL   Fibrinogen   Result Value Ref Range    Fibrinogen 229 200 - 397 mg/dL   CBC   Result Value Ref Range    WBC 5.2 4.0 - 11.0 K/uL    RBC 3.98 (L) 4.20 - 5.90 M/uL    Hemoglobin 12.7 (L) 13.5 - 17.5 g/dL    Hematocrit 38.0 (L) 40.5 - 52.5 %    MCV 95.7 80.0 - 100.0 fL    MCH 31.9 26.0 - 34.0 pg    MCHC 33.4 31.0 - 36.0 g/dL    RDW 13.7 12.4 - 15.4 %    Platelets 826 (L) 762 - 450 K/uL    MPV 8.3 5.0 - 10.5 fL   CBC   Result Value Ref Range    WBC 4.5 4.0 - 11.0 K/uL    RBC 3.75 (L) 4.20 - 5.90 M/uL    Hemoglobin 11.8 (L) 13.5 - 17.5 g/dL    Hematocrit 35.6 (L) 40.5 - 52.5 %    MCV 94.9 80.0 - 100.0 fL    MCH 31.4 26.0 - 34.0 pg    MCHC 33.1 31.0 - 36.0 g/dL    RDW 13.6 12.4 - 15.4 %    Platelets 495 (L) 708 - 450 K/uL    MPV 8.2 5.0 - 10.5 fL   Protime-INR   Result Value Ref Range    Protime 11.4 10.0 - 13.2 sec    INR 0.98 0.86 - 1.14   Protime-INR   Result Value Ref Range    Protime 11.2 10.0 - 13.2 sec    INR 0.97 0.86 - 1.14   APTT   Result Value Ref Range    aPTT 67.0 (H) 24.2 - 36.2 sec   APTT   Result Value Ref Range    aPTT 62.1 (H) 24.2 - 36.2 sec   Fibrinogen   Result Value Ref Range    Fibrinogen 213 200 - 397 mg/dL   Fibrinogen   Result Value Ref Range    Fibrinogen 204 200 - 397 mg/dL   CBC   Result Value Ref Range    WBC 4.5 4.0 - 11.0 K/uL    RBC 3.85 (L) 4.20 - 5.90 M/uL    Hemoglobin 12.3 (L) 13.5 - 17.5 g/dL    Hematocrit 36.7 (L) 40.5 - 52.5 %    MCV 95.3 80.0 - 100.0 fL    MCH 31.9 26.0 - 34.0 pg    MCHC 33.5 31.0 - 36.0 g/dL    RDW 14.0 12.4 - 15.4 %    Platelets 999 (L) 124 - 450 K/uL    MPV 8.3 5.0 - 10.5 fL   Protime-INR   Result Value Ref Range    Protime 11.0 10.0 - 13.2 sec    INR 0.95 0.86 - 1.14   APTT   Result Value Ref Range    aPTT 55.5 (H) 24.2 - 36.2 sec   Fibrinogen   Result Value Ref Range    Fibrinogen 199 (L) 200 - 397 mg/dL   Fibrinogen   Result Value Ref Range    Fibrinogen 102 (L) 200 - 397 mg/dL   Comprehensive Metabolic Panel   Result Value Ref Range    Sodium 135 (L) 136 - 145 mmol/L    Potassium 4.0 3.5 - 5.1 mmol/L    Chloride 97 (L) 99 - 110 mmol/L    CO2 26 21 - 32 mmol/L    Anion Gap 12 3 - 16    Glucose 155 (H) 70 - 99 mg/dL    BUN 5 (L) 7 - 20 mg/dL    CREATININE 0.5 (L) 0.8 - 1.3 mg/dL    GFR Non-African American >60 >60    GFR African American >60 >60    Calcium 8.2 (L) 8.3 - 10.6 mg/dL    Total Protein 5.7 (L) 6.4 - 8.2 g/dL    Alb 3.3 (L) 3.4 - 5.0 g/dL    Albumin/Globulin Ratio 1.4 1.1 - 2.2    Total Bilirubin 0.3 0.0 - 1.0 mg/dL    Alkaline Phosphatase 87 40 - 129 U/L    ALT 60 (H) 10 - 40 U/L    AST 57 (H) 15 - 37 U/L    Globulin 2.4 g/dL   APTT   Result Value Ref Range    aPTT 53.2 (H) 24.2 - 36.2 sec   CBC   Result Value Ref Range    WBC 5.0 4.0 - 11.0 K/uL    RBC 3.90 (L) 4.20 - 5.90 M/uL    Hemoglobin 12.4 (L) 13.5 - 17.5 g/dL    Hematocrit 37.2 (L) 40.5 - 52.5 %    MCV 95.3 80.0 - 100.0 fL    MCH 31.7 26.0 - 34.0 pg    MCHC 33.3 31.0 - 36.0 g/dL    RDW 13.8 12.4 - 15.4 %    Platelets 283 (L) 838 - 450 K/uL    MPV 8.3 5.0 - 10.5 fL   CBC   Result Value Ref Range    WBC 9.2 4.0 - 11.0 K/uL    RBC 3.80 (L) 4.20 - 5.90 M/uL    Hemoglobin 12.0 (L) 13.5 - 17.5 g/dL    Hematocrit 36.2 (L) 40.5 - 52.5 %    MCV 95.3 80.0 - 100.0 fL    MCH 31.5 26.0 - 34.0 pg    MCHC 33.0 31.0 - 36.0 g/dL    RDW 13.9 12.4 - 15.4 %    Platelets 111 (L) 135 - 450 K/uL    MPV 9.1 5.0 - 10.5 fL   Protime-INR   Result Value Ref Range    Protime 11.8 10.0 - 13.2 sec    INR 1.02 0.86 - 1.14   Fibrinogen   Result Value Ref Range    Fibrinogen 105 (L) 200 - 397 mg/dL   APTT   Result Value Ref Range    aPTT 34.4 24.2 - 36.2 sec   CBC   Result Value Ref Range    WBC 7.5 4.0 - 11.0 K/uL    RBC 3.27 (L) 4.20 - 5.90 M/uL    Hemoglobin 10.5 (L) 13.5 - 17.5 g/dL    Hematocrit 31.1 (L) 40.5 - 52.5 %    MCV 94.9 80.0 - 100.0 fL    MCH 32.0 26.0 - 34.0 pg    MCHC 33.7 31.0 - 36.0 g/dL    RDW 13.8 12.4 - 15.4 %    Platelets 84 (L) 716 - 450 K/uL    MPV 7.9 5.0 - 10.5 fL    PLATELET SLIDE REVIEW Decreased     SLIDE REVIEW see below    Protime-INR   Result Value Ref Range    Protime 12.7 10.0 - 13.2 sec    INR 1.09 0.86 - 1.14   SPECIMEN REJECTION   Result Value Ref Range    Rejected Test PT PTT FIB     Reason for Rejection see below    APTT   Result Value Ref Range    aPTT 106.1 (HH) 24.2 - 36.2 sec   Protime-INR   Result Value Ref Range    Protime 11.4 10.0 - 13.2 sec    INR 0.98 0.86 - 1.14   Fibrinogen   Result Value Ref Range    Fibrinogen 122 (L) 200 - 397 mg/dL   Protime-INR   Result Value Ref Range    Protime 12.8 10.0 - 13.2 sec    INR 1.10 0.86 - 1.14   APTT   Result Value Ref Range    aPTT 36.7 (H) 24.2 - 36.2 sec   APTT   Result Value Ref Range    aPTT 30.4 24.2 - 36.2 sec   CBC   Result Value Ref Range    WBC 6.9 4.0 - 11.0 K/uL    RBC 3.18 (L) 4.20 - 5.90 M/uL    Hemoglobin 10.1 (L) 13.5 - 17.5 g/dL    Hematocrit 30.5 (L) 40.5 - 52.5 %    MCV 95.7 80.0 - 100.0 fL    MCH 31.6 26.0 - 34.0 pg    MCHC 33.0 31.0 - 36.0 g/dL    RDW 13.9 12.4 - 15.4 %    Platelets 79 (L) 947 - 450 K/uL    MPV 9.1 5.0 - 10.5 fL   CBC   Result Value Ref Range    WBC 4.4 4.0 - 11.0 K/uL    RBC 2.38 (L) 4.20 - 5.90 M/uL    Hemoglobin 7.4 (L) 13.5 - 17.5 g/dL    Hematocrit 23.0 (L) 40.5 - 52.5 %    MCV 96.4 80.0 - 100.0 fL    MCH 31.2 26.0 - 34.0 pg    MCHC 32.4 31.0 - 36.0 g/dL    RDW 13.4 Bilirubin, Direct 0.3 0.0 - 0.3 mg/dL    Bilirubin, Indirect 0.1 0.0 - 1.0 mg/dL   Comprehensive Metabolic Panel   Result Value Ref Range    Sodium 133 (L) 136 - 145 mmol/L    Potassium 3.8 3.5 - 5.1 mmol/L    Chloride 96 (L) 99 - 110 mmol/L    CO2 26 21 - 32 mmol/L    Anion Gap 11 3 - 16    Glucose 101 (H) 70 - 99 mg/dL    BUN 6 (L) 7 - 20 mg/dL    CREATININE <0.5 (L) 0.8 - 1.3 mg/dL    GFR Non-African American >60 >60    GFR African American >60 >60    Calcium 7.8 (L) 8.3 - 10.6 mg/dL    Total Protein 5.8 (L) 6.4 - 8.2 g/dL    Alb 3.4 3.4 - 5.0 g/dL    Albumin/Globulin Ratio 1.4 1.1 - 2.2    Total Bilirubin 0.3 0.0 - 1.0 mg/dL    Alkaline Phosphatase 77 40 - 129 U/L    ALT 46 (H) 10 - 40 U/L    AST 38 (H) 15 - 37 U/L    Globulin 2.4 g/dL   Protime-INR   Result Value Ref Range    Protime 11.9 10.0 - 13.2 sec    INR 1.03 0.86 - 1.14   APTT   Result Value Ref Range    aPTT 30.0 24.2 - 36.2 sec   CBC   Result Value Ref Range    WBC 4.7 4.0 - 11.0 K/uL    RBC 2.47 (L) 4.20 - 5.90 M/uL    Hemoglobin 7.9 (L) 13.5 - 17.5 g/dL    Hematocrit 24.0 (L) 40.5 - 52.5 %    MCV 97.4 80.0 - 100.0 fL    MCH 32.2 26.0 - 34.0 pg    MCHC 33.1 31.0 - 36.0 g/dL    RDW 13.7 12.4 - 15.4 %    Platelets 70 (L) 622 - 450 K/uL    MPV 8.4 5.0 - 10.5 fL   Fibrinogen   Result Value Ref Range    Fibrinogen 119 (L) 200 - 397 mg/dL   Ammonia   Result Value Ref Range    Ammonia 22 16 - 60 umol/L   CBC Auto Differential   Result Value Ref Range    WBC 5.4 4.0 - 11.0 K/uL    RBC 2.31 (L) 4.20 - 5.90 M/uL    Hemoglobin 7.5 (L) 13.5 - 17.5 g/dL    Hematocrit 22.3 (L) 40.5 - 52.5 %    MCV 96.6 80.0 - 100.0 fL    MCH 32.3 26.0 - 34.0 pg    MCHC 33.5 31.0 - 36.0 g/dL    RDW 14.0 12.4 - 15.4 %    Platelets 445 500 - 915 K/uL    MPV 7.8 5.0 - 10.5 fL    Neutrophils % 61.8 %    Lymphocytes % 19.9 %    Monocytes % 15.8 %    Eosinophils % 1.9 %    Basophils % 0.6 %    Neutrophils Absolute 3.3 1.7 - 7.7 K/uL    Lymphocytes Absolute 1.1 1.0 - 5.1 K/uL    Monocytes

## 2020-07-17 NOTE — PROGRESS NOTES
Pt being discharged with [facility] transport. Pt to stretcher via stand and pivot with assistance. Pt IV and cardiac monitor removed. Pt has no complaints at this time.